# Patient Record
Sex: MALE | Race: WHITE | NOT HISPANIC OR LATINO | Employment: FULL TIME | URBAN - METROPOLITAN AREA
[De-identification: names, ages, dates, MRNs, and addresses within clinical notes are randomized per-mention and may not be internally consistent; named-entity substitution may affect disease eponyms.]

---

## 2019-01-10 ENCOUNTER — TRANSCRIBE ORDERS (OUTPATIENT)
Dept: ADMINISTRATIVE | Facility: HOSPITAL | Age: 49
End: 2019-01-10

## 2019-01-10 DIAGNOSIS — I48.91 ATRIAL FIBRILLATION, UNSPECIFIED TYPE (HCC): Primary | ICD-10-CM

## 2019-01-14 ENCOUNTER — TRANSCRIBE ORDERS (OUTPATIENT)
Dept: ADMINISTRATIVE | Facility: HOSPITAL | Age: 49
End: 2019-01-14

## 2019-01-14 DIAGNOSIS — I50.9 HEART FAILURE, UNSPECIFIED HF CHRONICITY, UNSPECIFIED HEART FAILURE TYPE (HCC): Primary | ICD-10-CM

## 2019-01-16 ENCOUNTER — HOSPITAL ENCOUNTER (OUTPATIENT)
Dept: NON INVASIVE DIAGNOSTICS | Facility: HOSPITAL | Age: 49
Discharge: HOME | End: 2019-01-16
Payer: COMMERCIAL

## 2019-01-16 DIAGNOSIS — I48.91 ATRIAL FIBRILLATION, UNSPECIFIED TYPE (HCC): ICD-10-CM

## 2019-01-16 PROCEDURE — 93306 TTE W/DOPPLER COMPLETE: CPT

## 2019-01-17 PROCEDURE — 93306 TTE W/DOPPLER COMPLETE: CPT | Performed by: INTERNAL MEDICINE

## 2019-01-21 ENCOUNTER — HOSPITAL ENCOUNTER (OUTPATIENT)
Dept: RADIOLOGY | Facility: HOSPITAL | Age: 49
Discharge: HOME | End: 2019-01-21
Payer: COMMERCIAL

## 2019-01-21 ENCOUNTER — HOSPITAL ENCOUNTER (OUTPATIENT)
Dept: NON INVASIVE DIAGNOSTICS | Facility: HOSPITAL | Age: 49
Discharge: HOME | End: 2019-01-21
Payer: COMMERCIAL

## 2019-01-21 DIAGNOSIS — I50.9 HEART FAILURE, UNSPECIFIED HF CHRONICITY, UNSPECIFIED HEART FAILURE TYPE (HCC): ICD-10-CM

## 2019-01-21 LAB
CHEST PAIN STATEMENT: NORMAL
MAX DIASTOLIC BP: 86 MMHG
MAX DIASTOLIC BP: 86 MMHG
MAX HEART RATE: 126 BPM
MAX HEART RATE: 150 BPM
MAX PREDICTED HEART RATE: 170 BPM
MAX PREDICTED HEART RATE: 170 BPM
MAX. SYSTOLIC BP: 156 MMHG
MAX. SYSTOLIC BP: 174 MMHG
PROTOCOL NAME: NORMAL
PROTOCOL NAME: NORMAL
REASON FOR TERMINATION: NORMAL
TARGET HR FORMULA: NORMAL
TARGET HR FORMULA: NORMAL
TEST INDICATION: NORMAL
TEST INDICATION: NORMAL
TIME IN EXERCISE PHASE: NORMAL
TIME IN EXERCISE PHASE: NORMAL

## 2019-01-21 PROCEDURE — A9502 TC99M TETROFOSMIN: HCPCS

## 2019-01-21 PROCEDURE — 93017 CV STRESS TEST TRACING ONLY: CPT

## 2019-01-21 PROCEDURE — 78452 HT MUSCLE IMAGE SPECT MULT: CPT

## 2019-01-21 RX ORDER — ATORVASTATIN CALCIUM 20 MG/1
20 TABLET, FILM COATED ORAL DAILY
COMMUNITY
End: 2019-09-25 | Stop reason: SDUPTHER

## 2019-01-21 RX ORDER — FUROSEMIDE 20 MG/1
40 TABLET ORAL DAILY
COMMUNITY
End: 2019-02-12 | Stop reason: ALTCHOICE

## 2019-01-21 RX ADMIN — REGADENOSON 0.4 MG: 0.08 INJECTION, SOLUTION INTRAVENOUS at 09:05

## 2019-01-22 PROCEDURE — 93018 CV STRESS TEST I&R ONLY: CPT | Performed by: INTERNAL MEDICINE

## 2019-01-22 PROCEDURE — 78452 HT MUSCLE IMAGE SPECT MULT: CPT | Performed by: INTERNAL MEDICINE

## 2019-01-22 PROCEDURE — 93016 CV STRESS TEST SUPVJ ONLY: CPT | Performed by: INTERNAL MEDICINE

## 2019-02-07 ENCOUNTER — OFFICE VISIT (OUTPATIENT)
Dept: CARDIOLOGY CLINIC | Facility: CLINIC | Age: 49
End: 2019-02-07
Payer: COMMERCIAL

## 2019-02-07 VITALS
WEIGHT: 228.3 LBS | HEIGHT: 73 IN | DIASTOLIC BLOOD PRESSURE: 90 MMHG | HEART RATE: 72 BPM | SYSTOLIC BLOOD PRESSURE: 110 MMHG | OXYGEN SATURATION: 96 % | BODY MASS INDEX: 30.26 KG/M2

## 2019-02-07 DIAGNOSIS — I50.33 ACUTE ON CHRONIC DIASTOLIC CHF (CONGESTIVE HEART FAILURE) (HCC): ICD-10-CM

## 2019-02-07 DIAGNOSIS — E78.2 MIXED HYPERLIPIDEMIA: ICD-10-CM

## 2019-02-07 DIAGNOSIS — I25.10 CAD IN NATIVE ARTERY: ICD-10-CM

## 2019-02-07 DIAGNOSIS — R06.00 EXERTIONAL DYSPNEA: Primary | ICD-10-CM

## 2019-02-07 DIAGNOSIS — R60.0 BILATERAL LEG EDEMA: ICD-10-CM

## 2019-02-07 DIAGNOSIS — R94.31 ABNORMAL EKG: ICD-10-CM

## 2019-02-07 DIAGNOSIS — R94.39 ABNORMAL STRESS TEST: ICD-10-CM

## 2019-02-07 PROCEDURE — 93000 ELECTROCARDIOGRAM COMPLETE: CPT | Performed by: INTERNAL MEDICINE

## 2019-02-07 PROCEDURE — 99245 OFF/OP CONSLTJ NEW/EST HI 55: CPT | Performed by: INTERNAL MEDICINE

## 2019-02-07 RX ORDER — METOPROLOL SUCCINATE 25 MG/1
25 TABLET, EXTENDED RELEASE ORAL
Qty: 30 TABLET | Refills: 3 | Status: SHIPPED | OUTPATIENT
Start: 2019-02-07 | End: 2019-03-08 | Stop reason: SDUPTHER

## 2019-02-07 RX ORDER — TEMAZEPAM 15 MG/1
15 CAPSULE ORAL ONCE
COMMUNITY
End: 2019-02-14 | Stop reason: ALTCHOICE

## 2019-02-07 RX ORDER — UREA 10 %
3 LOTION (ML) TOPICAL
COMMUNITY

## 2019-02-07 NOTE — PROGRESS NOTES
Cardiology New Consult  Fabian Vila  1970  [id number]  [address] [facility] [address]  [address] [address] [phone]      Reason for consultation:  Abnormal stress test    1  Exertional dyspnea     2  Abnormal stress test     3  Abnormal EKG     4  Mixed hyperlipidemia     5  CAD in native artery        Discussion/Plan:  Exertional dyspnea/abnormal stress test- plan to set-up cardiac cath to rule out obstructive CAD  Blood work prior to cath  He will hold Eliquis after Friday  If he develops chest pain he would come to the ED  He will continue with his atorvastatin plus metoprolol  Acute diastolic heart failure- possibly rate related  Continue furosemide 40 mg daily  Add metoprolol 25 mg daily  Will hold adding ACE-inhibitor  Plan for cardiac catheterization to rule out ischemic heart disease  Atrial fibrillation- currently on eliquis  Alcohol usage  Add metoprolol 25mg daily    Asthma    Alcohol abuse- cessation    Hld- continue atorvastatin    History:  49-year-old gentleman with no prior cardiac history presents with increased exertional shortness of breath found to be in atrial fibrillation  He also has had worsening lower extremity edema, PND and orthopnea  A nuclear stress test was completed which shows a inferior defect with moderate dc-infarct ischemia  He had an echo 2D which shows normal LV systolic function  He has been on a diuretic with improvement in his lower extremity edema but shortness of breath still remains  He is currently in rate controlled atrial fibrillation  He denies having any prior history of myocardial infarction or stroke  He is compliant with Eliquis without any significant bleeding  He currently denies having active chest pain  There is no problem list on file for this patient      Past Medical History:   Diagnosis Date    Asthma     Hyperlipidemia     Hypertension      Social History     Social History    Marital status: Single     Spouse name: N/A    Number of children: N/A    Years of education: N/A     Occupational History    Not on file  Social History Main Topics    Smoking status: Never Smoker    Smokeless tobacco: Current User     Types: Chew    Alcohol use 1 8 oz/week     3 Standard drinks or equivalent per week    Drug use: No    Sexual activity: Not on file     Other Topics Concern    Not on file     Social History Narrative    No narrative on file      Family History   Problem Relation Age of Onset    No Known Problems Mother     No Known Problems Father      History reviewed  No pertinent surgical history  Current Outpatient Prescriptions:     apixaban (ELIQUIS) 5 mg, Take 5 mg by mouth 2 (two) times a day, Disp: , Rfl:     atorvastatin (LIPITOR) 20 mg tablet, Take 20 mg by mouth daily, Disp: , Rfl:     furosemide (LASIX) 20 mg tablet, Take 40 mg by mouth daily  , Disp: , Rfl:     lansoprazole (PREVACID) 30 mg capsule, Take 30 mg by mouth daily  , Disp: , Rfl:     melatonin 1 mg, Take 1 mg by mouth daily at bedtime, Disp: , Rfl:     Mometasone Furo-Formoterol Fum (DULERA) 200-5 MCG/ACT AERO, Inhale, Disp: , Rfl:     POTASSIUM CHLORIDE ER PO, Take 10 mEq by mouth daily, Disp: , Rfl:     temazepam (RESTORIL) 15 mg capsule, Take 15 mg by mouth once, Disp: , Rfl:     albuterol (PROVENTIL HFA,VENTOLIN HFA) 90 mcg/act inhaler, Inhale 2 puffs every 6 (six) hours as needed for wheezing, Disp: , Rfl:     amLODIPine (NORVASC) 10 mg tablet, Take 10 mg by mouth daily  , Disp: , Rfl:     cloNIDine (CATAPRES) 0 1 mg tablet, Take 0 1 mg by mouth 2 (two) times a day , Disp: , Rfl:     ketorolac (TORADOL) 10 mg tablet, Take 1 tablet by mouth every 6 (six) hours as needed for moderate pain for up to 5 days, Disp: 20 tablet, Rfl: 0    ondansetron (ZOFRAN-ODT) 4 mg disintegrating tablet, Take 1 tablet by mouth every 8 (eight) hours as needed for nausea or vomiting for up to 7 days, Disp: 20 tablet, Rfl: 0    tamsulosin (FLOMAX) 0 4 mg, Take 0 4 mg by mouth daily with dinner , Disp: , Rfl:   No Known Allergies    Review of Systems:  Review of Systems   Constitutional: Negative  HENT: Negative  Eyes: Negative  Respiratory: Negative  Cardiovascular: Positive for chest pain and leg swelling  Gastrointestinal: Negative  Endocrine: Negative  Genitourinary: Negative  Musculoskeletal: Negative  Skin: Negative  Allergic/Immunologic: Negative  Neurological: Negative  Hematological: Negative  Psychiatric/Behavioral: Negative  Vitals:    02/07/19 1510   BP: 110/90   BP Location: Right arm   Patient Position: Sitting   Cuff Size: Large   Pulse: 72   SpO2: 96%   Weight: 104 kg (228 lb 4 8 oz)   Height: 6' 1" (1 854 m)     Physical Exam:  Physical Exam   Constitutional: He is oriented to person, place, and time  No distress  HENT:   Head: Normocephalic and atraumatic  Right Ear: External ear normal    Left Ear: External ear normal    Eyes: Pupils are equal, round, and reactive to light  Conjunctivae are normal  Right eye exhibits no discharge  Left eye exhibits no discharge  No scleral icterus  Neck: Normal range of motion  Neck supple  No JVD present  No tracheal deviation present  No thyromegaly present  Cardiovascular: An irregularly irregular rhythm present  Exam reveals gallop  Exam reveals no friction rub  No murmur heard  Pulmonary/Chest: Effort normal and breath sounds normal  No stridor  No respiratory distress  He has no wheezes  He has no rales  He exhibits no tenderness  Abdominal: Soft  Bowel sounds are normal  He exhibits distension  He exhibits no mass  There is no tenderness  There is no rebound and no guarding  Musculoskeletal: Normal range of motion  He exhibits edema  He exhibits no tenderness or deformity     Neurological: He is alert and oriented to person, place, and time  He has normal reflexes  No cranial nerve deficit  He exhibits normal muscle tone  Coordination normal    Skin: Skin is warm and dry  No rash noted  He is not diaphoretic  No erythema  No pallor  Psychiatric: He has a normal mood and affect  His behavior is normal  Judgment and thought content normal    Nursing note and vitals reviewed  Labs:     Lab Results   Component Value Date    WBC 6 20 2016    HGB 14 8 2016    HCT 43 0 2016    MCV 92 2016     2016     Lab Results   Component Value Date    K 3 7 2016    CL 99 (L) 2016    CO2 23 2016    BUN 17 2016    CREATININE 0 98 2016    CALCIUM 8 7 2016    AST 32 2016    ALT 56 2016    ALKPHOS 82 2016    EGFR >60 0 2016     No results found for: CHOL  No results found for: HDL  No results found for: LDLCALC  No results found for: TRIG  No results found for: HGBA1C    Imaging & Testing   I have personally reviewed pertinent reports  EKG: Personally reviewed      Atrial fibrillation poor r-wave progression no acute st/t wave changes    Cardiac testing:   Results for orders placed during the hospital encounter of 19   Echo complete with contrast if indicated    Narrative [address]  [address] [address]  [phone]    Transthoracic Echocardiogram  2D, M-mode, Doppler, and Color Doppler    Study date:  2019    Patient: Rock Mcgraw  MR number: [id number]  Account number: [de-identified]  : 1970  Age: 46 years  Gender: Male  Status: Outpatient  Location: Echo lab  Height: 72 in  Weight: 209 7 lb  BP: 129/ 82 mmHg    Indications: atrial fibrillation    Diagnoses: I48 0 - Atrial fibrillation    Sonographer:  CARROLL Arenas  Primary Physician:  Yoan Monterroso DO  Referring Physician:  Dennie Millard, MD  Group:  [facility]  Interpreting Physician:  Yosvany Wood MD    SUMMARY    LEFT VENTRICLE:  Definity given for improved LV definition  Systolic function was at the lower limits of normal  Ejection fraction was estimated in the range of 50 % to 55 % to be 55 %  There were no regional wall motion abnormalities  Wall thickness was mildly increased  LEFT ATRIUM:  The atrium was moderately dilated based on linear measurement of 4 4cm and index area of 35 ml/m2    RIGHT ATRIUM:  The atrium was mildly dilated based on right atrial area measurement of 22 cm2    MITRAL VALVE:  There was trace regurgitation  TRICUSPID VALVE:  The tricuspid jet envelope definition was inadequate for estimation of RV systolic pressure  There are no indirect findings (abnormal RV volume or geometry, altered pulmonary flow velocity profile, or leftward septal displacement) which  would suggest moderate or severe pulmonary hypertension  HISTORY: PRIOR HISTORY: HTN,Hyperlipidemia,Asthma  PROCEDURE: The procedure was performed in the echo lab  This was a routine study  The transthoracic approach was used  The study included complete 2D imaging, M-mode, complete spectral Doppler, and color Doppler  The heart rate was 105  bpm, at the start of the study  Images were obtained from the parasternal, apical, subcostal, and suprasternal notch acoustic windows  Intravenous contrast (Definity solution [1 3 ml Definity/8 7ml normal saline solution], 5 ml) was  administered to evaluate myocardial perfusion and opacify the left ventricle  Echocardiographic views were limited due to poor acoustic window availability, decreased penetration, and lung interference  This was a technically difficult  study  LEFT VENTRICLE: Definity given for improved LV definition Size was normal  Systolic function was at the lower limits of normal  Ejection fraction was estimated in the range of 50 % to 55 % to be 55 %  There were no regional wall motion  abnormalities  Wall thickness was mildly increased   No evidence of apical thrombus  DOPPLER: The study was not technically sufficient to allow evaluation of LV diastolic function  RIGHT VENTRICLE: The size was normal  Systolic function was normal  Wall thickness was normal     LEFT ATRIUM: The atrium was moderately dilated based on linear measurement of 4 4cm and index area of 35 ml/m2    RIGHT ATRIUM: The atrium was mildly dilated based on right atrial area measurement of 22 cm2    MITRAL VALVE: There was mild thickening  There was normal leaflet separation  DOPPLER: The transmitral velocity was within the normal range  There was no evidence for stenosis  There was trace regurgitation  AORTIC VALVE: The valve was probably trileaflet  Leaflets exhibited mildly increased thickness and normal cuspal separation  DOPPLER: Transaortic velocity was within the normal range  There was no evidence for stenosis  There was no  significant regurgitation  TRICUSPID VALVE: The valve structure was normal  There was normal leaflet separation  DOPPLER: The transtricuspid velocity was within the normal range  There was no evidence for stenosis  There was no significant regurgitation  The  tricuspid jet envelope definition was inadequate for estimation of RV systolic pressure  There are no indirect findings (abnormal RV volume or geometry, altered pulmonary flow velocity profile, or leftward septal displacement) which would  suggest moderate or severe pulmonary hypertension  PULMONIC VALVE: Leaflets exhibited normal thickness, no calcification, and normal cuspal separation  DOPPLER: The transpulmonic velocity was within the normal range  There was no significant regurgitation  PERICARDIUM: There was no pericardial effusion  The pericardium was normal in appearance  AORTA: The root exhibited normal size  SYSTEMIC VEINS: IVC: The inferior vena cava was normal in size      SYSTEM MEASUREMENT TABLES    2D mode  AoR Diam 2D: 3 6 cm  LA Diam (2D): 4 4 cm  LA/Ao (2D): 1 22  FS (2D Teich): 25 9 %  IVSd (2D): 1 2 cm  LVDEV: 113 cm³  LVESV: 55 5 cm³  LVIDd(2D): 4 9 cm  LVISd (2D): 3 63 cm  LVOT Area 2D: 3 14 cm squared  LVPWd (2D): 1 24 cm  SV (Teich): 57 5 cm³    Unspecified Scan Mode  DHARA Cont Eq (Peak Jason): 2 36 cm squared  LVOT Diam : 2 cm  LVOT Vmax: 775 mm/s  LVOT Vmax; Mean: 775 mm/s  Peak Grad ; Mean: 2 mm[Hg]  MV Peak E Jason  Mean: 780 mm/s  MVA (PHT): 4 cm squared  PHT: 55 ms  RA Area: 22 3 cm squared  RA Volume: 62 7 cm³  TAPSE: 1 6 cm    [employer] Accredited Echocardiography Laboratory    Prepared and electronically signed by    Tera Gonzales MD  Signed 2019 11:16:34       No results found for this or any previous visit  No results found for this or any previous visit  Results for orders placed during the hospital encounter of 19   NM myocardial perfusion spect (stress and/or rest)    [facility] [address]  [address] [address]  [phone]    Rest/Stress Gated SPECT Myocardial Perfusion Imaging After Regadenoson    Patient: Alli Vivar  MR number: [id number]  Account number: [de-identified]  : 1970  Age: 46 years  Gender: Male  Status: Outpatient  Location: Stress lab  Height: 72 in  Weight: 210 lb  BP: 156/ 86 mmHg    Allergies: NO KNOWN ALLERGIES    Diagnosis: I50 9 - Heart failure, unspecified    Primary Physician:  Leonora Padron DO  Technician:  Manish Armenta  RN:  Claudette Catching, BSN  Referring Physician:  Brinda Lin MD  [facility]  Report Prepared By[de-identified]  Claudette Catching, BSN  Interpreting Physician:  Julio Chiu MD    INDICATIONS: Evaluation for coronary artery disease  HISTORY: The patient is a 46year old  male  Chest pain status: no chest pain  Other symptoms: dyspnea  Coronary artery disease risk factors: dyslipidemia and hypertension  Cardiovascular history: arrhythmia  Co-morbidity: history  of lung disease   Medications:an anticoagulant, a calcium channel blocker, a diuretic, clopidogrel, and an antihypertensive agent  PHYSICAL EXAM: Baseline physical exam screening: normal and no wheezes audible  REST ECG: Atrial fibrillation  PROCEDURE: The study was performed in the the Stress lab  A regadenoson infusion pharmacologic stress test was performed  Gated SPECT myocardial perfusion imaging was performed after stress and at rest  Systolic blood pressure was 156  mmHg, at the start of the study  Diastolic blood pressure was 86 mmHg, at the start of the study  The heart rate was 117 bpm, at the start of the study  IV double checked  Regadenoson protocol:  Time HR bpm SBP mmHg DBP mmHg Symptoms Rhythm/conduct  Baseline 08:55 117 156 86 none A-fib, rare PVC's  Immediate 09:05 134 174 86 mild dyspnea same as above  1 min 09:50 126 148 84 same as above --  2 min 09:51 123 150 84 same as above --  3 min 09:50 110 152 84 subsiding --  No medications or fluids given  The patient also performed low level exercise  STRESS SUMMARY: Duration of pharmacologic stress was 3 min  Maximal heart rate during stress was 150 bpm  The heart rate response to stress was normal  There was resting hypertension with an appropriate blood pressure response to stress  The rate-pressure product for the peak heart rate and blood pressure was 68728  There was no chest pain during stress  The stress test was terminated due to protocol completion  Pre oxygen saturation: 96 %  Peak oxygen saturation: 98 %  The stress ECG was equivocal for ischemia  Arrhythmia during stress: isolated premature ventricular beats  ISOTOPE ADMINISTRATION:  Resting isotope administration Stress isotope administration  Agent Tetrofosmin Tetrofosmin  Date 01/21/2019 01/21/2019    The radiopharmaceutical was injected at the peak effect of pharmacologic stress  MYOCARDIAL PERFUSION IMAGING:  Left ventricular size was normal  The TID ratio was   86      PERFUSION DEFECTS:  -  There was a moderate to large, severe, partially reversible myocardial perfusion defect of the entire inferolateral and inferoapical wall  GATED SPECT:  The calculated left ventricular ejection fraction was 43 %  Left ventricular ejection fraction was mildly decreased by visual estimate  There was moderately reduced myocardial thickening and motion of the lateral wall and inferior wall of  the left ventricle  SUMMARY:  -  Stress results: There was resting hypertension with an appropriate blood pressure response to stress  There was no chest pain during stress  -  ECG conclusions: The stress ECG was equivocal for ischemia  -  Perfusion imaging: There was a moderate to large, severe, partially reversible myocardial perfusion defect of the entire inferolateral and inferoapical wall  -  Gated SPECT: The calculated left ventricular ejection fraction was 43 %  Left ventricular ejection fraction was mildly decreased by visual estimate  There was moderately reduced myocardial thickening and motion of the lateral wall and  inferior wall of the left ventricle  -  Impressions and recommendations: Left ventricular systolic function was reduced, with regional wall motion abnormalities  Ef 43%  Pt was in atrial Fib during study  IMPRESSIONS: Abnormal study after pharmacologic vasodilation  There was a moderate-sized infarct and a moderate amount of ischemia in the distribution of right coronary artery or the left circumflex coronary artery  Left ventricular  systolic function was reduced, with regional wall motion abnormalities  Ef 43%  Pt was in atrial Fib during study      Prepared and signed by    Shiva Ruvalcaba MD  Signed 2019 12:01:54           Storm Jackman  Please call with any questions or suggestions    A description of the counselin min abnormal stress test, consent, catheterization  Goals and Barriers:  Patient's ability to self care:  Medication side effect reviewed with patient in detail and all their questions answered  "This note has been constructed using a voice recognition system  Therefore there may be syntax, spelling, and/or grammatical errors   Please call if you have any questions  "

## 2019-02-12 ENCOUNTER — HOSPITAL ENCOUNTER (OUTPATIENT)
Dept: NON INVASIVE DIAGNOSTICS | Facility: HOSPITAL | Age: 49
Discharge: HOME | End: 2019-02-12
Attending: INTERNAL MEDICINE | Admitting: INTERNAL MEDICINE
Payer: COMMERCIAL

## 2019-02-12 VITALS
SYSTOLIC BLOOD PRESSURE: 117 MMHG | BODY MASS INDEX: 30.22 KG/M2 | TEMPERATURE: 96.7 F | RESPIRATION RATE: 20 BRPM | OXYGEN SATURATION: 98 % | WEIGHT: 228 LBS | HEART RATE: 80 BPM | HEIGHT: 73 IN | DIASTOLIC BLOOD PRESSURE: 68 MMHG

## 2019-02-12 DIAGNOSIS — R94.39 ABNORMAL STRESS TEST: ICD-10-CM

## 2019-02-12 DIAGNOSIS — R60.0 BILATERAL LEG EDEMA: ICD-10-CM

## 2019-02-12 DIAGNOSIS — I25.10 CAD IN NATIVE ARTERY: ICD-10-CM

## 2019-02-12 PROCEDURE — 99152 MOD SED SAME PHYS/QHP 5/>YRS: CPT | Performed by: INTERNAL MEDICINE

## 2019-02-12 PROCEDURE — 99153 MOD SED SAME PHYS/QHP EA: CPT | Performed by: INTERNAL MEDICINE

## 2019-02-12 PROCEDURE — C1769 GUIDE WIRE: HCPCS | Performed by: INTERNAL MEDICINE

## 2019-02-12 PROCEDURE — 93458 L HRT ARTERY/VENTRICLE ANGIO: CPT | Performed by: INTERNAL MEDICINE

## 2019-02-12 PROCEDURE — 33210 INSERT ELECTRD/PM CATH SNGL: CPT | Performed by: INTERNAL MEDICINE

## 2019-02-12 PROCEDURE — C1894 INTRO/SHEATH, NON-LASER: HCPCS | Performed by: INTERNAL MEDICINE

## 2019-02-12 RX ORDER — HEPARIN SODIUM 1000 [USP'U]/ML
INJECTION, SOLUTION INTRAVENOUS; SUBCUTANEOUS CODE/TRAUMA/SEDATION MEDICATION
Status: COMPLETED | OUTPATIENT
Start: 2019-02-12 | End: 2019-02-12

## 2019-02-12 RX ORDER — SODIUM CHLORIDE 9 MG/ML
100 INJECTION, SOLUTION INTRAVENOUS CONTINUOUS
Status: DISPENSED | OUTPATIENT
Start: 2019-02-12 | End: 2019-02-12

## 2019-02-12 RX ORDER — MIDAZOLAM HYDROCHLORIDE 1 MG/ML
INJECTION INTRAMUSCULAR; INTRAVENOUS CODE/TRAUMA/SEDATION MEDICATION
Status: COMPLETED | OUTPATIENT
Start: 2019-02-12 | End: 2019-02-12

## 2019-02-12 RX ORDER — ASPIRIN 81 MG/1
TABLET, CHEWABLE ORAL CODE/TRAUMA/SEDATION MEDICATION
Status: COMPLETED | OUTPATIENT
Start: 2019-02-12 | End: 2019-02-12

## 2019-02-12 RX ORDER — LIDOCAINE HYDROCHLORIDE 10 MG/ML
INJECTION, SOLUTION INFILTRATION; PERINEURAL CODE/TRAUMA/SEDATION MEDICATION
Status: COMPLETED | OUTPATIENT
Start: 2019-02-12 | End: 2019-02-12

## 2019-02-12 RX ORDER — NITROGLYCERIN 20 MG/100ML
INJECTION INTRAVENOUS CODE/TRAUMA/SEDATION MEDICATION
Status: COMPLETED | OUTPATIENT
Start: 2019-02-12 | End: 2019-02-12

## 2019-02-12 RX ORDER — VERAPAMIL HYDROCHLORIDE 2.5 MG/ML
INJECTION, SOLUTION INTRAVENOUS CODE/TRAUMA/SEDATION MEDICATION
Status: COMPLETED | OUTPATIENT
Start: 2019-02-12 | End: 2019-02-12

## 2019-02-12 RX ORDER — FUROSEMIDE 40 MG/1
40 TABLET ORAL 2 TIMES DAILY
COMMUNITY
End: 2019-02-27 | Stop reason: SDUPTHER

## 2019-02-12 RX ORDER — FENTANYL CITRATE 50 UG/ML
INJECTION, SOLUTION INTRAMUSCULAR; INTRAVENOUS CODE/TRAUMA/SEDATION MEDICATION
Status: COMPLETED | OUTPATIENT
Start: 2019-02-12 | End: 2019-02-12

## 2019-02-12 RX ADMIN — LIDOCAINE HYDROCHLORIDE ANHYDROUS 2 ML: 10 INJECTION, SOLUTION INFILTRATION at 09:35

## 2019-02-12 RX ADMIN — IOHEXOL 70 ML: 350 INJECTION, SOLUTION INTRAVENOUS at 09:47

## 2019-02-12 RX ADMIN — FENTANYL CITRATE 25 MCG: 50 INJECTION INTRAMUSCULAR; INTRAVENOUS at 09:18

## 2019-02-12 RX ADMIN — VERAPAMIL HYDROCHLORIDE 2.5 MG: 2.5 INJECTION, SOLUTION INTRAVENOUS at 09:24

## 2019-02-12 RX ADMIN — ASPIRIN 324 MG: 81 TABLET, CHEWABLE ORAL at 09:05

## 2019-02-12 RX ADMIN — LIDOCAINE HYDROCHLORIDE ANHYDROUS 2 ML: 10 INJECTION, SOLUTION INFILTRATION at 09:22

## 2019-02-12 RX ADMIN — HEPARIN SODIUM 4000 UNITS: 1000 INJECTION INTRAVENOUS; SUBCUTANEOUS at 09:24

## 2019-02-12 RX ADMIN — MIDAZOLAM HYDROCHLORIDE 1 MG: 1 INJECTION, SOLUTION INTRAMUSCULAR; INTRAVENOUS at 09:18

## 2019-02-12 RX ADMIN — MIDAZOLAM HYDROCHLORIDE 1 MG: 1 INJECTION, SOLUTION INTRAMUSCULAR; INTRAVENOUS at 09:12

## 2019-02-12 RX ADMIN — FENTANYL CITRATE 25 MCG: 50 INJECTION INTRAMUSCULAR; INTRAVENOUS at 09:12

## 2019-02-12 RX ADMIN — NITROGLYCERIN 200 MCG: 20 INJECTION INTRAVENOUS at 09:24

## 2019-02-12 NOTE — H&P
Patient seen and evaluated  Spoke to patient's family  His stress test and echo reviewed  H and P reviewed  No changes patient is holding his Eliquis  He has shortness of breath with some orthopnea for some time  Feeling little bit better  He is also in atrial fibrillation  All risks benefit alternate complication discussed with the patient labs reviewed  Risk and complication of heart attack stroke death but not limited to it discussed with the patient wishes to proceed consent obtained

## 2019-02-14 ENCOUNTER — CONSULT (OUTPATIENT)
Dept: PULMONOLOGY | Facility: MEDICAL CENTER | Age: 49
End: 2019-02-14
Payer: COMMERCIAL

## 2019-02-14 VITALS
OXYGEN SATURATION: 98 % | DIASTOLIC BLOOD PRESSURE: 84 MMHG | TEMPERATURE: 98.5 F | RESPIRATION RATE: 12 BRPM | BODY MASS INDEX: 29.69 KG/M2 | HEIGHT: 73 IN | SYSTOLIC BLOOD PRESSURE: 122 MMHG | HEART RATE: 81 BPM | WEIGHT: 224 LBS

## 2019-02-14 DIAGNOSIS — R06.02 SOBOE (SHORTNESS OF BREATH ON EXERTION): Primary | ICD-10-CM

## 2019-02-14 DIAGNOSIS — I48.91 ATRIAL FIBRILLATION, UNSPECIFIED TYPE (HCC): ICD-10-CM

## 2019-02-14 PROCEDURE — 99244 OFF/OP CNSLTJ NEW/EST MOD 40: CPT | Performed by: INTERNAL MEDICINE

## 2019-02-14 PROCEDURE — 94010 BREATHING CAPACITY TEST: CPT | Performed by: INTERNAL MEDICINE

## 2019-02-14 NOTE — PROGRESS NOTES
Assessment/Plan:       Problem List Items Addressed This Visit        Cardiovascular and Mediastinum    Atrial fibrillation [facility])     This was recently diagnosed and cardiac catheterization done shows no of significant coronary artery disease  Alexsander Odonnell is on Eliquis now and will be following up with cardiologist Dr Aavni Connor  It is possible his persistent atrial fibrillation may be causing some limitation of his cardiac output with exercise leading to his symptoms with exertional shortness of breath            Other    SOBOE (shortness of breath on exertion) - Primary     Alexsander Odonnell has been having exertional dyspnea for well over 1 year but it has been more problems the last couple months  Spirometry today shows some borderline mild restrictive impairment  Cannot exclude component of air trapping  Does have the question of possible underlying asthma  Will order a complete pulmonary function test to assess for air trapping take it because of slight decrease in his FEV1  Forced vital capacity is borderline decreased to 4 5 L or 81% of predicted  FEV1 is slightly reduced at 3 26 L 76% of predicted with obstructive index of 72%       As Alexsander Odonnell does not think that the [facility] MDI has ever helped him , I  Instructed him to discontinue it and he can uses rescue ProAir inhaler as needed  He will call me know if he feels like his breathing gets worse when he stops to [facility]  I want him to go for pulmonary function test and anywhere from 2-4 weeks after he stopped the [facility] to see if there is any evidence of air trapping or any airflow obstruction  It is possible that his exertional dyspnea could be related to his atrial fibrillation and some slight decreased cardiac output with exertion because of his atrial fibrillation  Relevant Orders    POCT spirometry (Completed)    Complete pulmonary function test            Return if symptoms worsen or fail to improve    All questions are answered to the patient's satisfaction and understanding  He verbalizes understanding  He is encouraged to call with any further questions or concerns  Portions of the record may have been created with voice recognition software  Occasional wrong word or "sound a like" substitutions may have occurred due to the inherent limitations of voice recognition software  Read the chart carefully and recognize, using context, where substitutions have occurred  a    Electronically Signed by Michael Cabrera DO    ______________________________________________________________________    Chief Complaint:   Chief Complaint   Patient presents with    Shortness of Breath     dx in hosp  pt had cath done  pt states that he has notice any differences in his breathing during exertion  no cough    Wheezing        Patient ID: Mandy Pierce is a 46 y o  y o  male has a past medical history of Asthma, Atrial fibrillation ([address] ), Hyperlipidemia, and Hypertension  2/14/2019  Patient presents today for initial visit  HPI    Mandy Pierce is a 39-year-old male who presents for evaluation of exertional shortness of breath  He states he has had some mild shortness of breath with very exertional active this started about 2 years ago but has been more prominent over the past few months  No associated chest pain or wheezing  He was started on Dulera 200 mcg 2 puffs daily about a year so ago for his shortness of breath with thoughts that he may had some mild asthma  He never really noted any improvement with the [facility]  In the past few weeks he has had increased shortness of breath with exertional activity  When he climbs up a ladder has to walk up a hill he has some shortness of breath  His Dulera was increased to 2 puffs twice a day and still he notes no improvement in his exertional shortness of breath  He did not have any chronic cough  Last chest x-ray which are reviewed done in 2016 was normal   He is a nonsmoker    He denies any history of asthma when he was younger  He does not have any significant season allergies  He does work for a [employer]	[place] and frequently has a climb up trees and ladders  He only has an occasional wheeze  He does have a history of GERD and HTN  Jorge Zaragoza did see his family physician Dr Chante Hook for complaints of his exertional shortness of breath  An EKG was done in his office and this showed evidence of atrial fibrillation  He was then referred to cardiologist Dr Felicia Barone  Echocardiogram was normal and cardiac catheterization which was done showed no significant coronary artery disease  He was started on Eliquis 5 mg twice a day and also furosemide 40 mg twice a day  Is also now taking Lipitor 20 mg daily and Toprol XL 25 mg daily  Cardiac catheterization was done at [facility] on 02/12/19  This showed only mild coronary artery disease  There was a 30% diffuse stenosis the mid LAD and 25% stenosis of the mid circumflex  Left ventricular ejection fraction was normal   There was 25% stenosis the middle 3rd of the right coronary artery  Echocardiogram done January 16, 2019 showed normal left ventricle wall motion with ejection fraction of 55%  Right ventricle size was normal   No evidence of any significant valvular disease  Labwork from 1/2/19 showed hemoglobin of 15 5 with slight elevation platelet count at 115  Triglycerides were elevated 247 cluster was elevated 274  Serum creatinine was normal at 0 96  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[place] [employer]    Review of Systems   Constitutional: Negative for activity change, appetite change, diaphoresis, fatigue and fever  HENT: Negative for congestion, postnasal drip and rhinorrhea  Eyes: Negative for redness  Respiratory: Positive for shortness of breath  Negative for cough and wheezing  Cardiovascular: Negative for chest pain, palpitations and leg swelling  Endocrine: Negative for polydipsia and polyphagia  Genitourinary: Negative for dysuria  Neurological: Negative for dizziness and light-headedness  Social history: He reports that he has never smoked  His smokeless tobacco use includes chew  He reports that he drinks about 1 8 oz of alcohol per week  He reports that he does not use drugs  Past surgical history: History reviewed  No pertinent surgical history  No prior surgeries    Family history:   Family History   Problem Relation Age of Onset    No Known Problems Mother     Prostate cancer Father          There is no immunization history on file for this patient  Current Outpatient Medications   Medication Sig Dispense Refill    albuterol (PROVENTIL HFA,VENTOLIN HFA) 90 mcg/act inhaler Inhale 2 puffs every 6 (six) hours as needed for wheezing      apixaban (ELIQUIS) 5 mg Take 1 tablet (5 mg total) by mouth 2 (two) times a day 30 tablet 0    atorvastatin (LIPITOR) 20 mg tablet Take 20 mg by mouth daily      furosemide (LASIX) 40 mg tablet Take 40 mg by mouth 2 (two) times a day      lansoprazole (PREVACID) 30 mg capsule Take 30 mg by mouth daily   melatonin 1 mg Take 1 mg by mouth daily at bedtime      metoprolol succinate (TOPROL-XL) 25 mg 24 hr tablet Take 1 tablet (25 mg total) by mouth daily in the early morning 30 tablet 3    Mometasone Furo-Formoterol Fum (DULERA) 200-5 MCG/ACT AERO Inhale      POTASSIUM CHLORIDE ER PO Take 10 mEq by mouth daily       No current facility-administered medications for this visit  Allergies: Patient has no known allergies  Objective:  Vitals:    02/14/19 0924   BP: 122/84   BP Location: Left arm   Patient Position: Sitting   Cuff Size: Standard   Pulse: 81   Resp: 12   Temp: 98 5 °F (36 9 °C)   TempSrc: Tympanic   SpO2: 98%   Weight: 102 kg (224 lb)   Height: 6' 1" (1 854 m)   Oxygen Therapy  SpO2: 98 %      Wt Readings from Last 3 Encounters:   02/14/19 102 kg (224 lb)   02/12/19 103 kg (228 lb)   02/07/19 104 kg (228 lb 4 8 oz)     Body mass index is 29 55 kg/m²  Physical Exam   Constitutional: He is oriented to person, place, and time  He appears well-developed and well-nourished  No distress  HENT:   Head: Normocephalic  Nose: Nose normal    Mouth/Throat: Oropharynx is clear and moist  No oropharyngeal exudate  Mallampati score is 1   Eyes: Pupils are equal, round, and reactive to light  Conjunctivae are normal    Neck: Neck supple  No JVD present  No tracheal deviation present  Cardiovascular: Normal rate, regular rhythm and normal heart sounds  Exam reveals no decreased pulses (PFT)  Pulmonary/Chest: Effort normal and breath sounds normal    Lung sounds are clear   Abdominal: Soft  He exhibits no distension  There is no tenderness  There is no guarding  Musculoskeletal: He exhibits no edema  Lymphadenopathy:     He has no cervical adenopathy  Neurological: He is alert and oriented to person, place, and time  Skin: Skin is warm and dry  No rash noted  Psychiatric: He has a normal mood and affect   His behavior is normal  Thought content normal      Room air O2 sat at rest was 97% and with ambulating up and down 2 flights of stairs was 100%    Office Spirometry Results:done today  FVC - 4 50 L   81%  FEV1 - 3 26 L  76%  FEV1/FVC% - 72%    Borderline mild restrictive impairment

## 2019-02-14 NOTE — ASSESSMENT & PLAN NOTE
Carmelo Cole has been having exertional dyspnea for well over 1 year but it has been more problems the last couple months  Spirometry today shows some borderline mild restrictive impairment  Cannot exclude component of air trapping  Does have the question of possible underlying asthma  Will order a complete pulmonary function test to assess for air trapping take it because of slight decrease in his FEV1  Forced vital capacity is borderline decreased to 4 5 L or 81% of predicted  FEV1 is slightly reduced at 3 26 L 76% of predicted with obstructive index of 72%       As Carmelo Cole does not think that the [facility] MDI has ever helped him , I  Instructed him to discontinue it and he can uses rescue ProAir inhaler as needed  He will call me know if he feels like his breathing gets worse when he stops to [facility]  I want him to go for pulmonary function test and anywhere from 2-4 weeks after he stopped the [facility] to see if there is any evidence of air trapping or any airflow obstruction  It is possible that his exertional dyspnea could be related to his atrial fibrillation and some slight decreased cardiac output with exertion because of his atrial fibrillation

## 2019-02-14 NOTE — PATIENT INSTRUCTIONS
Stop the [facility] and go for pulmonary function test sometime in early March    Call my office after you have this done for results    Have cardioversion done as per Dr Nick Guerrero    If you have difficulty breathing when you are off the [facility] contact my office

## 2019-02-17 PROBLEM — I48.91 ATRIAL FIBRILLATION (HCC): Status: ACTIVE | Noted: 2019-02-17

## 2019-02-18 NOTE — ASSESSMENT & PLAN NOTE
This was recently diagnosed and cardiac catheterization done shows no of significant coronary artery disease  Cale Leslie is on Eliquis now and will be following up with cardiologist Dr Ernst Mukherjee    It is possible his persistent atrial fibrillation may be causing some limitation of his cardiac output with exercise leading to his symptoms with exertional shortness of breath

## 2019-02-27 ENCOUNTER — OFFICE VISIT (OUTPATIENT)
Dept: CARDIOLOGY CLINIC | Facility: CLINIC | Age: 49
End: 2019-02-27
Payer: COMMERCIAL

## 2019-02-27 VITALS
OXYGEN SATURATION: 97 % | WEIGHT: 224.5 LBS | HEIGHT: 73 IN | BODY MASS INDEX: 29.75 KG/M2 | SYSTOLIC BLOOD PRESSURE: 120 MMHG | HEART RATE: 95 BPM | DIASTOLIC BLOOD PRESSURE: 80 MMHG

## 2019-02-27 DIAGNOSIS — R94.31 ABNORMAL EKG: ICD-10-CM

## 2019-02-27 DIAGNOSIS — I50.33 ACUTE ON CHRONIC DIASTOLIC CHF (CONGESTIVE HEART FAILURE) (HCC): ICD-10-CM

## 2019-02-27 DIAGNOSIS — R94.39 ABNORMAL STRESS TEST: ICD-10-CM

## 2019-02-27 DIAGNOSIS — I48.19 PERSISTENT ATRIAL FIBRILLATION (HCC): ICD-10-CM

## 2019-02-27 DIAGNOSIS — R60.0 BILATERAL LEG EDEMA: ICD-10-CM

## 2019-02-27 PROCEDURE — 93000 ELECTROCARDIOGRAM COMPLETE: CPT | Performed by: INTERNAL MEDICINE

## 2019-02-27 PROCEDURE — 99214 OFFICE O/P EST MOD 30 MIN: CPT | Performed by: INTERNAL MEDICINE

## 2019-02-27 RX ORDER — FUROSEMIDE 40 MG/1
40 TABLET ORAL DAILY
Qty: 90 TABLET | Refills: 1
Start: 2019-02-27 | End: 2019-02-27 | Stop reason: SDUPTHER

## 2019-02-27 RX ORDER — FLECAINIDE ACETATE 50 MG/1
50 TABLET ORAL 2 TIMES DAILY
Qty: 60 TABLET | Refills: 3 | Status: SHIPPED | OUTPATIENT
Start: 2019-02-27 | End: 2019-03-08 | Stop reason: SDUPTHER

## 2019-02-27 RX ORDER — FUROSEMIDE 40 MG/1
20 TABLET ORAL DAILY
Qty: 90 TABLET | Refills: 1
Start: 2019-02-27 | End: 2019-03-08 | Stop reason: SDUPTHER

## 2019-02-27 NOTE — PROGRESS NOTES
Cardiology Follow-up  Naya Prom  1970  [id number]  [address] [facility] [address] [id number] [phone]      1  Persistent atrial fibrillation (HCC)  POCT ECG    flecainide (TAMBOCOR) 50 mg tablet    furosemide (LASIX) 40 mg tablet    CARDIOVERSION (NON INVASIVE ONLY)    Basic metabolic panel    DISCONTINUED: furosemide (LASIX) 40 mg tablet   2  Acute on chronic diastolic CHF (congestive heart failure) (HCC)  furosemide (LASIX) 40 mg tablet    CARDIOVERSION (NON INVASIVE ONLY)    Basic metabolic panel    DISCONTINUED: furosemide (LASIX) 40 mg tablet   3  Bilateral leg edema  furosemide (LASIX) 40 mg tablet    DISCONTINUED: furosemide (LASIX) 40 mg tablet   4  Abnormal EKG  furosemide (LASIX) 40 mg tablet    DISCONTINUED: furosemide (LASIX) 40 mg tablet   5  Abnormal stress test  apixaban (ELIQUIS) 5 mg      Discussion/Plan:  Exertional dyspnea/abnormal stress test- cardiac catheterization negative for obstructive CAD  Abnormal pulmonary function test likely with component of COPD  Component may be secondary to the patient's atrial fibrillation  He has never had trial of cardioversion  He has been on anticoagulation for more than 4 weeks without stopping  We will try cardioversion  Given he has a normal EF without obstructive CAD we will start flecainide 50 mg twice a day plus continue metoprolol  Acute diastolic heart failure- possibly rate related  Continue furosemide 40 mg daily  Add metoprolol 25 mg daily  Will hold adding ACE-inhibitor  Plan for cardiac catheterization to rule out ischemic heart disease  Atrial fibrillation- currently on eliquis  Alcohol usage   Add metoprolol 25mg daily    Asthma    Alcohol abuse- cessation    Hld- continue atorvastatin    History:  61-year-old gentleman with no prior cardiac history presents with increased exertional shortness of breath found to be in atrial fibrillation  He also has had worsening lower extremity edema, PND and orthopnea  A nuclear stress test was completed which shows a inferior defect with moderate dc-infarct ischemia  He had an echo 2D which shows normal LV systolic function  He has been on a diuretic with improvement in his lower extremity edema but shortness of breath still remains  He is currently in rate controlled atrial fibrillation  He denies having any prior history of myocardial infarction or stroke  He is compliant with Eliquis without any significant bleeding  He currently denies having active chest pain  Patient Active Problem List   Diagnosis    SOBOE (shortness of breath on exertion)    Atrial fibrillation (HCC)     Past Medical History:   Diagnosis Date    Asthma     Atrial fibrillation ([facility] [address] )     Hyperlipidemia     Hypertension      Social History     Socioeconomic History    Marital status: Significant Other     Spouse name: Not on file    Number of children: Not on file    Years of education: Not on file    Highest education level: Not on file   Occupational History    Not on file   Social Needs    Financial resource strain: Not on file    Food insecurity:     Worry: Not on file     Inability: Not on file    Transportation needs:     Medical: Not on file     Non-medical: Not on file   Tobacco Use    Smoking status: Never Smoker    Smokeless tobacco: Current User     Types: Chew    Tobacco comment: uses chewing tobacco   Substance and Sexual Activity    Alcohol use:  Yes     Alcohol/week: 1 8 oz     Types: 3 Standard drinks or equivalent per week     Frequency: 4 or more times a week     Drinks per session: 3 or 4    Drug use: No    Sexual activity: Not on file   Lifestyle    Physical activity:     Days per week: Not on file     Minutes per session: Not on file    Stress: Not on file   Relationships    Social connections:     Talks on phone: Not on file     Gets together: Not on file     Attends Church service: Not on file     Active member of club or organization: Not on file     Attends meetings of clubs or organizations: Not on file     Relationship status: Not on file    Intimate partner violence:     Fear of current or ex partner: Not on file     Emotionally abused: Not on file     Physically abused: Not on file     Forced sexual activity: Not on file   Other Topics Concern    Not on file   Social History Narrative    Not on file      Family History   Problem Relation Age of Onset    No Known Problems Mother     Prostate cancer Father      History reviewed  No pertinent surgical history  Current Outpatient Medications:     apixaban (ELIQUIS) 5 mg, Take 1 tablet (5 mg total) by mouth 2 (two) times a day, Disp: 180 tablet, Rfl: 3    atorvastatin (LIPITOR) 20 mg tablet, Take 20 mg by mouth daily, Disp: , Rfl:     furosemide (LASIX) 40 mg tablet, Take 0 5 tablets (20 mg total) by mouth daily, Disp: 90 tablet, Rfl: 1    lansoprazole (PREVACID) 30 mg capsule, Take 30 mg by mouth daily  , Disp: , Rfl:     melatonin 1 mg, Take 1 mg by mouth daily at bedtime, Disp: , Rfl:     metoprolol succinate (TOPROL-XL) 25 mg 24 hr tablet, Take 1 tablet (25 mg total) by mouth daily in the early morning, Disp: 30 tablet, Rfl: 3    POTASSIUM CHLORIDE ER PO, Take 10 mEq by mouth daily, Disp: , Rfl:     albuterol (PROVENTIL HFA,VENTOLIN HFA) 90 mcg/act inhaler, Inhale 2 puffs every 6 (six) hours as needed for wheezing, Disp: , Rfl:     flecainide (TAMBOCOR) 50 mg tablet, Take 1 tablet (50 mg total) by mouth 2 (two) times a day, Disp: 60 tablet, Rfl: 3    Mometasone Furo-Formoterol Fum (DULERA) 200-5 MCG/ACT AERO, Inhale, Disp: , Rfl:   No Known Allergies    Review of Systems:  Review of Systems   Constitutional: Negative  HENT: Negative  Eyes: Negative  Respiratory: Positive for shortness of breath  Cardiovascular: Positive for chest pain   Negative for leg swelling  Gastrointestinal: Negative  Endocrine: Negative  Genitourinary: Negative  Musculoskeletal: Negative  Skin: Negative  Allergic/Immunologic: Negative  Neurological: Negative  Hematological: Negative  Psychiatric/Behavioral: Negative  Vitals:    02/27/19 1256   BP: 120/80   BP Location: Right arm   Patient Position: Sitting   Cuff Size: Large   Pulse: 95   SpO2: 97%   Weight: 102 kg (224 lb 8 oz)   Height: 6' 1" (1 854 m)     Physical Exam:  Physical Exam   Constitutional: He is oriented to person, place, and time  No distress  HENT:   Head: Normocephalic and atraumatic  Right Ear: External ear normal    Left Ear: External ear normal    Eyes: Pupils are equal, round, and reactive to light  Conjunctivae are normal  Right eye exhibits no discharge  Left eye exhibits no discharge  No scleral icterus  Neck: Normal range of motion  Neck supple  No JVD present  No tracheal deviation present  No thyromegaly present  Cardiovascular: An irregularly irregular rhythm present  Exam reveals gallop  Exam reveals no friction rub  No murmur heard  Pulmonary/Chest: Effort normal and breath sounds normal  No stridor  No respiratory distress  He has no wheezes  He has no rales  He exhibits no tenderness  Abdominal: Soft  Bowel sounds are normal  He exhibits distension  He exhibits no mass  There is no tenderness  There is no rebound and no guarding  Musculoskeletal: Normal range of motion  He exhibits edema  He exhibits no tenderness or deformity  Neurological: He is alert and oriented to person, place, and time  He has normal reflexes  No cranial nerve deficit  He exhibits normal muscle tone  Coordination normal    Skin: Skin is warm and dry  No rash noted  He is not diaphoretic  No erythema  No pallor  Psychiatric: He has a normal mood and affect  His behavior is normal  Judgment and thought content normal    Nursing note and vitals reviewed        Labs:     Lab Results Component Value Date    WBC 6 20 2016    HGB 14 8 2016    HCT 43 0 2016    MCV 92 2016     2016     Lab Results   Component Value Date    K 3 7 2016    CL 99 (L) 2016    CO2 23 2016    BUN 17 2016    CREATININE 0 98 2016    CALCIUM 8 7 2016    AST 32 2016    ALT 56 2016    ALKPHOS 82 2016    EGFR >60 0 2016     No results found for: CHOL  No results found for: HDL  No results found for: LDLCALC  No results found for: TRIG  No results found for: HGBA1C    Imaging & Testing   I have personally reviewed pertinent reports  EKG: Personally reviewed  Atrial fibrillation poor r-wave progression no acute st/t wave changes    Cardiac testing:   Results for orders placed during the hospital encounter of 19   Echo complete with contrast if indicated    Narrative [address]  [address] [facility]  [phone]    Transthoracic Echocardiogram  2D, M-mode, Doppler, and Color Doppler    Study date:  2019    Patient: Eunice Deluca  MR number: [id number]  Account number: [de-identified]  : 1970  Age: 46 years  Gender: Male  Status: Outpatient  Location: Echo lab  Height: 72 in  Weight: 209 7 lb  BP: 129/ 82 mmHg    Indications: atrial fibrillation    Diagnoses: I48 0 - Atrial fibrillation    Sonographer:  CARROLL Blanco  Primary Physician:  Luis Miguel Knott DO  Referring Physician:  Mono Denis MD  Group:  [facility]  Interpreting Physician:  Surjit Reza MD    SUMMARY    LEFT VENTRICLE:  Definity given for improved LV definition  Systolic function was at the lower limits of normal  Ejection fraction was estimated in the range of 50 % to 55 % to be 55 %  There were no regional wall motion abnormalities  Wall thickness was mildly increased      LEFT ATRIUM:  The atrium was moderately dilated based on linear measurement of 4 4cm and index area of 35 ml/m2    RIGHT ATRIUM:  The atrium was mildly dilated based on right atrial area measurement of 22 cm2    MITRAL VALVE:  There was trace regurgitation  TRICUSPID VALVE:  The tricuspid jet envelope definition was inadequate for estimation of RV systolic pressure  There are no indirect findings (abnormal RV volume or geometry, altered pulmonary flow velocity profile, or leftward septal displacement) which  would suggest moderate or severe pulmonary hypertension  HISTORY: PRIOR HISTORY: HTN,Hyperlipidemia,Asthma  PROCEDURE: The procedure was performed in the echo lab  This was a routine study  The transthoracic approach was used  The study included complete 2D imaging, M-mode, complete spectral Doppler, and color Doppler  The heart rate was 105  bpm, at the start of the study  Images were obtained from the parasternal, apical, subcostal, and suprasternal notch acoustic windows  Intravenous contrast (Definity solution [1 3 ml Definity/8 7ml normal saline solution], 5 ml) was  administered to evaluate myocardial perfusion and opacify the left ventricle  Echocardiographic views were limited due to poor acoustic window availability, decreased penetration, and lung interference  This was a technically difficult  study  LEFT VENTRICLE: Definity given for improved LV definition Size was normal  Systolic function was at the lower limits of normal  Ejection fraction was estimated in the range of 50 % to 55 % to be 55 %  There were no regional wall motion  abnormalities  Wall thickness was mildly increased  No evidence of apical thrombus  DOPPLER: The study was not technically sufficient to allow evaluation of LV diastolic function      RIGHT VENTRICLE: The size was normal  Systolic function was normal  Wall thickness was normal     LEFT ATRIUM: The atrium was moderately dilated based on linear measurement of 4 4cm and index area of 35 ml/m2    RIGHT ATRIUM: The atrium was mildly dilated based on right atrial area measurement of 22 cm2    MITRAL VALVE: There was mild thickening  There was normal leaflet separation  DOPPLER: The transmitral velocity was within the normal range  There was no evidence for stenosis  There was trace regurgitation  AORTIC VALVE: The valve was probably trileaflet  Leaflets exhibited mildly increased thickness and normal cuspal separation  DOPPLER: Transaortic velocity was within the normal range  There was no evidence for stenosis  There was no  significant regurgitation  TRICUSPID VALVE: The valve structure was normal  There was normal leaflet separation  DOPPLER: The transtricuspid velocity was within the normal range  There was no evidence for stenosis  There was no significant regurgitation  The  tricuspid jet envelope definition was inadequate for estimation of RV systolic pressure  There are no indirect findings (abnormal RV volume or geometry, altered pulmonary flow velocity profile, or leftward septal displacement) which would  suggest moderate or severe pulmonary hypertension  PULMONIC VALVE: Leaflets exhibited normal thickness, no calcification, and normal cuspal separation  DOPPLER: The transpulmonic velocity was within the normal range  There was no significant regurgitation  PERICARDIUM: There was no pericardial effusion  The pericardium was normal in appearance  AORTA: The root exhibited normal size  SYSTEMIC VEINS: IVC: The inferior vena cava was normal in size  SYSTEM MEASUREMENT TABLES    2D mode  AoR Diam 2D: 3 6 cm  LA Diam (2D): 4 4 cm  LA/Ao (2D): 1 22  FS (2D Teich): 25 9 %  IVSd (2D): 1 2 cm  LVDEV: 113 cm³  LVESV: 55 5 cm³  LVIDd(2D): 4 9 cm  LVISd (2D): 3 63 cm  LVOT Area 2D: 3 14 cm squared  LVPWd (2D): 1 24 cm  SV (Teich): 57 5 cm³    Unspecified Scan Mode  DHARA Cont Eq (Peak Jason): 2 36 cm squared  LVOT Diam : 2 cm  LVOT Vmax: 775 mm/s  LVOT Vmax; Mean: 775 mm/s  Peak Grad ; Mean: 2 mm[Hg]  MV Peak E Jason   Mean: 780 mm/s  MVA (PHT): 4 cm squared  PHT: 55 ms  RA Area: 22 3 cm squared  RA Volume: 62 7 cm³  TAPSE: 1 6 cm    [facility] Accredited Echocardiography Laboratory    Prepared and electronically signed by    Lisseth Mariano MD  Signed 2019 11:16:34       No results found for this or any previous visit  No results found for this or any previous visit  Results for orders placed during the hospital encounter of 19   NM myocardial perfusion spect (stress and/or rest)    [facility] [address]  [address] [facility]  [phone]    Rest/Stress Gated SPECT Myocardial Perfusion Imaging After Regadenoson    Patient: Daniel Weller  MR number: [id number]  Account number: [de-identified]  : 1970  Age: 46 years  Gender: Male  Status: Outpatient  Location: Stress lab  Height: 72 in  Weight: 210 lb  BP: 156/ 86 mmHg    Allergies: NO KNOWN ALLERGIES    Diagnosis: I50 9 - Heart failure, unspecified    Primary Physician:  Wendy Yuan DO  Technician:  Alf Jacobson  RN:  ELIER Daly  Referring Physician:  Shane Damon MD  Group:  [facility]  Report Prepared By[de-identified]  ELIER Daly  Interpreting Physician:  Leatha Jiang MD    INDICATIONS: Evaluation for coronary artery disease  HISTORY: The patient is a 46year old  male  Chest pain status: no chest pain  Other symptoms: dyspnea  Coronary artery disease risk factors: dyslipidemia and hypertension  Cardiovascular history: arrhythmia  Co-morbidity: history  of lung disease  Medications:an anticoagulant, a calcium channel blocker, a diuretic, clopidogrel, and an antihypertensive agent  PHYSICAL EXAM: Baseline physical exam screening: normal and no wheezes audible  REST ECG: Atrial fibrillation  PROCEDURE: The study was performed in the the Stress lab  A regadenoson infusion pharmacologic stress test was performed   Gated SPECT myocardial perfusion imaging was performed after stress and at rest  Systolic blood pressure was 156  mmHg, at the start of the study  Diastolic blood pressure was 86 mmHg, at the start of the study  The heart rate was 117 bpm, at the start of the study  IV double checked  Regadenoson protocol:  Time HR bpm SBP mmHg DBP mmHg Symptoms Rhythm/conduct  Baseline 08:55 117 156 86 none A-fib, rare PVC's  Immediate 09:05 134 174 86 mild dyspnea same as above  1 min 09:50 126 148 84 same as above --  2 min 09:51 123 150 84 same as above --  3 min 09:50 110 152 84 subsiding --  No medications or fluids given  The patient also performed low level exercise  STRESS SUMMARY: Duration of pharmacologic stress was 3 min  Maximal heart rate during stress was 150 bpm  The heart rate response to stress was normal  There was resting hypertension with an appropriate blood pressure response to stress  The rate-pressure product for the peak heart rate and blood pressure was 67187  There was no chest pain during stress  The stress test was terminated due to protocol completion  Pre oxygen saturation: 96 %  Peak oxygen saturation: 98 %  The stress ECG was equivocal for ischemia  Arrhythmia during stress: isolated premature ventricular beats  ISOTOPE ADMINISTRATION:  Resting isotope administration Stress isotope administration  Agent Tetrofosmin Tetrofosmin  Date 01/21/2019 01/21/2019    The radiopharmaceutical was injected at the peak effect of pharmacologic stress  MYOCARDIAL PERFUSION IMAGING:  Left ventricular size was normal  The TID ratio was   86  PERFUSION DEFECTS:  -  There was a moderate to large, severe, partially reversible myocardial perfusion defect of the entire inferolateral and inferoapical wall  GATED SPECT:  The calculated left ventricular ejection fraction was 43 %  Left ventricular ejection fraction was mildly decreased by visual estimate   There was moderately reduced myocardial thickening and motion of the lateral wall and inferior wall of  the left ventricle  SUMMARY:  -  Stress results: There was resting hypertension with an appropriate blood pressure response to stress  There was no chest pain during stress  -  ECG conclusions: The stress ECG was equivocal for ischemia  -  Perfusion imaging: There was a moderate to large, severe, partially reversible myocardial perfusion defect of the entire inferolateral and inferoapical wall  -  Gated SPECT: The calculated left ventricular ejection fraction was 43 %  Left ventricular ejection fraction was mildly decreased by visual estimate  There was moderately reduced myocardial thickening and motion of the lateral wall and  inferior wall of the left ventricle  -  Impressions and recommendations: Left ventricular systolic function was reduced, with regional wall motion abnormalities  Ef 43%  Pt was in atrial Fib during study  IMPRESSIONS: Abnormal study after pharmacologic vasodilation  There was a moderate-sized infarct and a moderate amount of ischemia in the distribution of right coronary artery or the left circumflex coronary artery  Left ventricular  systolic function was reduced, with regional wall motion abnormalities  Ef 43%  Pt was in atrial Fib during study  Prepared and signed by    Radha De Guzman MD  Signed 2019 12:01:54           Pedro Jackman  Please call with any questions or suggestions    A description of the counselin min abnormal stress test, consent, catheterization  Goals and Barriers:  Patient's ability to self care:  Medication side effect reviewed with patient in detail and all their questions answered  "This note has been constructed using a voice recognition system  Therefore there may be syntax, spelling, and/or grammatical errors   Please call if you have any questions  "

## 2019-03-01 ENCOUNTER — ANESTHESIA EVENT (OUTPATIENT)
Dept: NON INVASIVE DIAGNOSTICS | Facility: HOSPITAL | Age: 49
End: 2019-03-01

## 2019-03-01 ENCOUNTER — ANESTHESIA (OUTPATIENT)
Dept: NON INVASIVE DIAGNOSTICS | Facility: HOSPITAL | Age: 49
End: 2019-03-01

## 2019-03-01 ENCOUNTER — HOSPITAL ENCOUNTER (OUTPATIENT)
Dept: NON INVASIVE DIAGNOSTICS | Facility: HOSPITAL | Age: 49
Discharge: HOME | End: 2019-03-01
Admitting: INTERNAL MEDICINE
Payer: COMMERCIAL

## 2019-03-01 VITALS
RESPIRATION RATE: 18 BRPM | DIASTOLIC BLOOD PRESSURE: 76 MMHG | OXYGEN SATURATION: 95 % | HEART RATE: 71 BPM | SYSTOLIC BLOOD PRESSURE: 109 MMHG

## 2019-03-01 DIAGNOSIS — I48.19 PERSISTENT ATRIAL FIBRILLATION (HCC): ICD-10-CM

## 2019-03-01 DIAGNOSIS — I50.33 ACUTE ON CHRONIC DIASTOLIC CHF (CONGESTIVE HEART FAILURE) (HCC): ICD-10-CM

## 2019-03-01 PROCEDURE — 92960 CARDIOVERSION ELECTRIC EXT: CPT | Performed by: INTERNAL MEDICINE

## 2019-03-01 PROCEDURE — 92960 CARDIOVERSION ELECTRIC EXT: CPT

## 2019-03-01 RX ORDER — PROPOFOL 10 MG/ML
INJECTION, EMULSION INTRAVENOUS AS NEEDED
Status: DISCONTINUED | OUTPATIENT
Start: 2019-03-01 | End: 2019-03-01 | Stop reason: SURG

## 2019-03-01 RX ORDER — SODIUM CHLORIDE 9 MG/ML
INJECTION, SOLUTION INTRAVENOUS CONTINUOUS PRN
Status: DISCONTINUED | OUTPATIENT
Start: 2019-03-01 | End: 2019-03-01 | Stop reason: SURG

## 2019-03-01 RX ORDER — SODIUM CHLORIDE 9 MG/ML
INJECTION, SOLUTION INTRAVENOUS
Status: COMPLETED | OUTPATIENT
Start: 2019-03-01 | End: 2019-03-01

## 2019-03-01 RX ORDER — LIDOCAINE HYDROCHLORIDE 10 MG/ML
INJECTION, SOLUTION INFILTRATION; PERINEURAL AS NEEDED
Status: DISCONTINUED | OUTPATIENT
Start: 2019-03-01 | End: 2019-03-01 | Stop reason: SURG

## 2019-03-01 RX ADMIN — PROPOFOL 100 MG: 10 INJECTION, EMULSION INTRAVENOUS at 07:40

## 2019-03-01 RX ADMIN — LIDOCAINE HYDROCHLORIDE 50 MG: 10 INJECTION, SOLUTION INFILTRATION; PERINEURAL at 07:40

## 2019-03-01 RX ADMIN — PROPOFOL 30 MG: 10 INJECTION, EMULSION INTRAVENOUS at 07:41

## 2019-03-01 RX ADMIN — Medication 50 ML/HR: at 07:10

## 2019-03-01 RX ADMIN — SODIUM CHLORIDE: 0.9 INJECTION, SOLUTION INTRAVENOUS at 07:32

## 2019-03-01 NOTE — SEDATION DOCUMENTATION
Elective Cardioversion successful  Patient instructed to continue to take his Eliquis 2x/day and Tambacor 2x/day  Verbalizes understanding and D/C home in stable condition with his wife

## 2019-03-01 NOTE — ANESTHESIA PREPROCEDURE EVALUATION
Review of Systems/Medical History  Patient summary reviewed  Chart reviewed  No history of anesthetic complications     Cardiovascular  EKG reviewed, Exercise tolerance (METS): >4,  Hyperlipidemia, Hypertension controlled, Dysrhythmias , atrial fibrillation,   Comment: SUMMARY     LEFT VENTRICLE:  Definity given for improved LV definition  Systolic function was at the lower limits of normal  Ejection fraction was estimated in the range of 50 % to 55 % to be 55 %  There were no regional wall motion abnormalities  Wall thickness was mildly increased      LEFT ATRIUM:  The atrium was moderately dilated based on linear measurement of 4 4cm and index area of 35 ml/m2     RIGHT ATRIUM:  The atrium was mildly dilated based on right atrial area measurement of 22 cm2     MITRAL VALVE:  There was trace regurgitation      TRICUSPID VALVE:  The tricuspid jet envelope definition was inadequate for estimation of RV systolic pressure  There are no indirect findings (abnormal RV volume or geometry, altered pulmonary flow velocity profile, or leftward septal displacement) which  would suggest moderate or severe pulmonary hypertension      HISTORY: PRIOR HISTORY: HTN,Hyperlipidemia,Asthma ,  Pulmonary  Asthma , well controlled/ stable ,   Comment: Off meds by pulmonologist      GI/Hepatic    GERD well controlled,        Negative  ROS        Endo/Other  Negative endo/other ROS      GYN       Hematology    Coagulation disorder currently taking oral anticoagulants,   Comment: Anticoagulated  Musculoskeletal  Negative musculoskeletal ROS        Neurology  Negative neurology ROS      Psychology   Negative psychology ROS     Comment: 5-6 beers nightly         Physical Exam    Airway    Mallampati score:  I  TM Distance: >3 FB  Neck ROM: full     Dental   No notable dental hx     Cardiovascular  Rhythm: irregular, Rate: abnormal,     Pulmonary  Breath sounds clear to auscultation,     Other Findings        Anesthesia Plan  ASA Score- 3     Anesthesia Type- IV sedation with anesthesia with ASA Monitors  Additional Monitors:   Airway Plan:         Plan Factors-Patient not instructed to abstain from smoking on day of procedure       Induction- intravenous  Postoperative Plan-     Informed Consent- Anesthetic plan and risks discussed with patient

## 2019-03-01 NOTE — ANESTHESIA POSTPROCEDURE EVALUATION
Post-Op Assessment Note    CV Status:  Stable  Pain Score: 0    Pain management: adequate     Mental Status:  Alert and awake   Hydration Status:  Euvolemic   PONV Controlled:  Controlled   Airway Patency:  Patent   Post Op Vitals Reviewed: Yes      Staff: CRNA           /88 (03/01/19 0748)    Temp      Pulse 68 (03/01/19 0748)   Resp 14 (03/01/19 0748)    SpO2 97 % (03/01/19 0748)

## 2019-03-08 ENCOUNTER — OFFICE VISIT (OUTPATIENT)
Dept: CARDIOLOGY CLINIC | Facility: CLINIC | Age: 49
End: 2019-03-08
Payer: COMMERCIAL

## 2019-03-08 VITALS
DIASTOLIC BLOOD PRESSURE: 80 MMHG | OXYGEN SATURATION: 98 % | HEART RATE: 81 BPM | HEIGHT: 73 IN | BODY MASS INDEX: 29.97 KG/M2 | WEIGHT: 226.1 LBS | SYSTOLIC BLOOD PRESSURE: 120 MMHG

## 2019-03-08 DIAGNOSIS — I50.33 ACUTE ON CHRONIC DIASTOLIC CHF (CONGESTIVE HEART FAILURE) (HCC): ICD-10-CM

## 2019-03-08 DIAGNOSIS — R94.31 ABNORMAL EKG: ICD-10-CM

## 2019-03-08 DIAGNOSIS — I48.19 PERSISTENT ATRIAL FIBRILLATION (HCC): ICD-10-CM

## 2019-03-08 DIAGNOSIS — R60.0 BILATERAL LEG EDEMA: ICD-10-CM

## 2019-03-08 DIAGNOSIS — R06.00 EXERTIONAL DYSPNEA: ICD-10-CM

## 2019-03-08 DIAGNOSIS — R94.39 ABNORMAL STRESS TEST: ICD-10-CM

## 2019-03-08 PROCEDURE — 93000 ELECTROCARDIOGRAM COMPLETE: CPT | Performed by: INTERNAL MEDICINE

## 2019-03-08 PROCEDURE — 99214 OFFICE O/P EST MOD 30 MIN: CPT | Performed by: INTERNAL MEDICINE

## 2019-03-08 RX ORDER — ASPIRIN 81 MG/1
81 TABLET, CHEWABLE ORAL DAILY
Qty: 180 TABLET | Refills: 3 | Status: SHIPPED | OUTPATIENT
Start: 2019-03-08 | End: 2020-03-09

## 2019-03-08 RX ORDER — POTASSIUM CHLORIDE 750 MG/1
10 TABLET, FILM COATED, EXTENDED RELEASE ORAL DAILY
Qty: 90 TABLET | Refills: 1 | Status: SHIPPED | OUTPATIENT
Start: 2019-03-08 | End: 2019-10-17

## 2019-03-08 RX ORDER — FUROSEMIDE 40 MG/1
TABLET ORAL
Qty: 90 TABLET | Refills: 1
Start: 2019-03-08 | End: 2019-10-17

## 2019-03-08 RX ORDER — METOPROLOL SUCCINATE 25 MG/1
25 TABLET, EXTENDED RELEASE ORAL
Qty: 90 TABLET | Refills: 3 | Status: SHIPPED | OUTPATIENT
Start: 2019-03-08 | End: 2019-10-17 | Stop reason: SDUPTHER

## 2019-03-08 RX ORDER — FLECAINIDE ACETATE 50 MG/1
50 TABLET ORAL 2 TIMES DAILY
Qty: 60 TABLET | Refills: 3 | Status: SHIPPED | OUTPATIENT
Start: 2019-03-08 | End: 2020-09-18 | Stop reason: SDUPTHER

## 2019-03-08 NOTE — PROGRESS NOTES
Cardiology Follow-up  Tj Montana  1970  [id number]  [address] [facility] [address] [address] [phone]      1  Exertional dyspnea  POCT ECG    metoprolol succinate (TOPROL-XL) 25 mg 24 hr tablet   2  Abnormal stress test  POCT ECG    metoprolol succinate (TOPROL-XL) 25 mg 24 hr tablet    apixaban (ELIQUIS) 5 mg   3  Persistent atrial fibrillation (HCC)  POCT ECG    flecainide (TAMBOCOR) 50 mg tablet    furosemide (LASIX) 40 mg tablet   4  Acute on chronic diastolic CHF (congestive heart failure) (HCC)  furosemide (LASIX) 40 mg tablet    potassium chloride (K-DUR) 10 mEq tablet   5  Bilateral leg edema  furosemide (LASIX) 40 mg tablet   6  Abnormal EKG  furosemide (LASIX) 40 mg tablet      Discussion/Plan:  Exertional dyspnea/abnormal stress test- cardiac catheterization negative for obstructive CAD  Abnormal pulmonary function test likely with component of COPD  Component may be secondary to the patient's atrial fibrillation  Given he has a normal EF without obstructive CAD we will start flecainide 50 mg twice a day plus continue metoprolol  Acute diastolic heart failure- possibly rate related  Continue furosemide 40 mg every other day Add metoprolol 25 mg daily  Will hold adding ACE-inhibitor  Plan for cardiac catheterization to rule out ischemic heart disease  Atrial fibrillation- He feels much better since cardioversion  currently on eliquis  Alcohol usage  Successful cardioversion continue metoprolol 25 mg daily plus flecainide 50 mg twice a day  [id number]  He will complete eliquis and switch to aspirin  CAD- aspirin + atorvastatin    Asthma    Alcohol abuse- cessation    Hld- continue atorvastatin    History:  64-year-old gentleman with no prior cardiac history presents with increased exertional shortness of breath found to be in atrial fibrillation    He also has had worsening lower extremity edema, PND and orthopnea  A nuclear stress test was completed which shows a inferior defect with moderate dc-infarct ischemia  He had an echo 2D which shows normal LV systolic function  He has been on a diuretic with improvement in his lower extremity edema but shortness of breath still remains  He is currently in rate controlled atrial fibrillation  He denies having any prior history of myocardial infarction or stroke  He is compliant with Eliquis without any significant bleeding  He currently denies having active chest pain  03/08/2019:  He has felt much better since his cardioversion  He feels like he has much better exercise stamina  He denies having lower extremity edema  Denies having PND orthopnea  Patient Active Problem List   Diagnosis    SOBOE (shortness of breath on exertion)    Atrial fibrillation ([facility])     Past Medical History:   Diagnosis Date    Asthma     [facility]     Hyperlipidemia     Hypertension      Social History     Socioeconomic History    Marital status: Significant Other     Spouse name: Not on file    Number of children: Not on file    Years of education: Not on file    Highest education level: Not on file   Occupational History    Not on file   Social Needs    Financial resource strain: Not on file    Food insecurity:     Worry: Not on file     Inability: Not on file    Transportation needs:     Medical: Not on file     Non-medical: Not on file   Tobacco Use    Smoking status: Never Smoker    Smokeless tobacco: Current User     Types: Chew    Tobacco comment: uses chewing tobacco   Substance and Sexual Activity    Alcohol use:  Yes     Alcohol/week: 1 8 oz     Types: 3 Standard drinks or equivalent per week     Frequency: 4 or more times a week     Drinks per session: 3 or 4    Drug use: No    Sexual activity: Not on file   Lifestyle    Physical activity:     Days per week: Not on file     Minutes per session: Not on file    Stress: Not on file   Relationships    Social connections:     Talks on phone: Not on file     Gets together: Not on file     Attends [employer] service: Not on file     Active member of club or organization: Not on file     Attends meetings of clubs or organizations: Not on file     Relationship status: Not on file    Intimate partner violence:     Fear of current or ex partner: Not on file     Emotionally abused: Not on file     Physically abused: Not on file     Forced sexual activity: Not on file   Other Topics Concern    Not on file   Social History Narrative    Not on file      Family History   Problem Relation Age of Onset    No Known Problems Mother     Prostate cancer Father      History reviewed  No pertinent surgical history  Current Outpatient Medications:     apixaban (ELIQUIS) 5 mg, Take 1 tablet (5 mg total) by mouth 2 (two) times a day, Disp: 180 tablet, Rfl: 3    atorvastatin (LIPITOR) 20 mg tablet, Take 20 mg by mouth daily, Disp: , Rfl:     flecainide (TAMBOCOR) 50 mg tablet, Take 1 tablet (50 mg total) by mouth 2 (two) times a day, Disp: 60 tablet, Rfl: 3    furosemide (LASIX) 40 mg tablet, Take half tablet every other day, Disp: 90 tablet, Rfl: 1    lansoprazole (PREVACID) 30 mg capsule, Take 30 mg by mouth daily  , Disp: , Rfl:     melatonin 1 mg, Take 1 mg by mouth daily at bedtime, Disp: , Rfl:     metoprolol succinate (TOPROL-XL) 25 mg 24 hr tablet, Take 1 tablet (25 mg total) by mouth daily in the early morning, Disp: 90 tablet, Rfl: 3    Mometasone Furo-Formoterol Fum (DULERA) 200-5 MCG/ACT AERO, Inhale, Disp: , Rfl:     potassium chloride (K-DUR) 10 mEq tablet, Take 1 tablet (10 mEq total) by mouth daily, Disp: 90 tablet, Rfl: 1  No Known Allergies    Review of Systems:  Review of Systems   Constitutional: Negative  HENT: Negative  Eyes: Negative  Respiratory: Positive for shortness of breath      Cardiovascular: Positive for chest pain  Negative for leg swelling  Gastrointestinal: Negative  Endocrine: Negative  Genitourinary: Negative  Musculoskeletal: Negative  Skin: Negative  Allergic/Immunologic: Negative  Neurological: Negative  Hematological: Negative  Psychiatric/Behavioral: Negative  Vitals:    03/08/19 1444   BP: 120/80   BP Location: Right arm   Patient Position: Sitting   Cuff Size: Large   Pulse: 81   SpO2: 98%   Weight: 103 kg (226 lb 1 6 oz)   Height: 6' 1" (1 854 m)     Physical Exam:  Physical Exam   Constitutional: He is oriented to person, place, and time  No distress  HENT:   Head: Normocephalic and atraumatic  Right Ear: External ear normal    Left Ear: External ear normal    Eyes: Pupils are equal, round, and reactive to light  Conjunctivae are normal  Right eye exhibits no discharge  Left eye exhibits no discharge  No scleral icterus  Neck: Normal range of motion  Neck supple  No JVD present  No tracheal deviation present  No thyromegaly present  Cardiovascular: Regular rhythm  Exam reveals gallop  Exam reveals no friction rub  No murmur heard  Pulmonary/Chest: Effort normal and breath sounds normal  No stridor  No respiratory distress  He has no wheezes  He has no rales  He exhibits no tenderness  Abdominal: Soft  Bowel sounds are normal  He exhibits distension  He exhibits no mass  There is no tenderness  There is no rebound and no guarding  Musculoskeletal: Normal range of motion  He exhibits edema  He exhibits no tenderness or deformity  Neurological: He is alert and oriented to person, place, and time  He has normal reflexes  No cranial nerve deficit  He exhibits normal muscle tone  Coordination normal    Skin: Skin is warm and dry  No rash noted  He is not diaphoretic  No erythema  No pallor  Psychiatric: He has a normal mood and affect  His behavior is normal  Judgment and thought content normal    Nursing note and vitals reviewed        Labs:     Lab Results Component Value Date    WBC 6 20 2016    HGB 14 8 2016    HCT 43 0 2016    MCV 92 2016     2016     Lab Results   Component Value Date    K 3 7 2016    CL 99 (L) 2016    CO2 23 2016    BUN 17 2016    CREATININE 0 98 2016    CALCIUM 8 7 2016    AST 32 2016    ALT 56 2016    ALKPHOS 82 2016    EGFR >60 0 2016     No results found for: CHOL  No results found for: HDL  No results found for: LDLCALC  No results found for: TRIG  No results found for: HGBA1C    Imaging & Testing   I have personally reviewed pertinent reports  EKG: Personally reviewed  Normal sinus rhythm inferior-t wave inversions    Cardiac testing:   Results for orders placed during the hospital encounter of 19   Echo complete with contrast if indicated    Narrative [address]  [address] [address]  [phone]    Transthoracic Echocardiogram  2D, M-mode, Doppler, and Color Doppler    Study date:  2019    Patient: Mellissa Sanchez  MR number: [id number]  Account number: [de-identified]  : 1970  Age: 46 years  Gender: Male  Status: Outpatient  Location: Echo lab  Height: 72 in  Weight: 209 7 lb  BP: 129/ 82 mmHg    Indications: atrial fibrillation    Diagnoses: I48 0 - Atrial fibrillation    Sonographer:  CARROLL Burnett  Primary Physician:  Nicholos Boas, DO  Referring Physician:  Jorge Luis Lucas MD  Group:  [id number]  Interpreting Physician:  Jennifer Valdes MD    SUMMARY    LEFT VENTRICLE:  Definity given for improved LV definition  Systolic function was at the lower limits of normal  Ejection fraction was estimated in the range of 50 % to 55 % to be 55 %  There were no regional wall motion abnormalities  Wall thickness was mildly increased      LEFT ATRIUM:  The atrium was moderately dilated based on linear measurement of 4 4cm and index area of 35 ml/m2    RIGHT ATRIUM:  The atrium was mildly dilated based on right atrial area measurement of 22 cm2    MITRAL VALVE:  There was trace regurgitation  TRICUSPID VALVE:  The tricuspid jet envelope definition was inadequate for estimation of RV systolic pressure  There are no indirect findings (abnormal RV volume or geometry, altered pulmonary flow velocity profile, or leftward septal displacement) which  would suggest moderate or severe pulmonary hypertension  HISTORY: PRIOR HISTORY: HTN,Hyperlipidemia,Asthma  PROCEDURE: The procedure was performed in the echo lab  This was a routine study  The transthoracic approach was used  The study included complete 2D imaging, M-mode, complete spectral Doppler, and color Doppler  The heart rate was 105  bpm, at the start of the study  Images were obtained from the parasternal, apical, subcostal, and suprasternal notch acoustic windows  Intravenous contrast (Definity solution [1 3 ml Definity/8 7ml normal saline solution], 5 ml) was  administered to evaluate myocardial perfusion and opacify the left ventricle  Echocardiographic views were limited due to poor acoustic window availability, decreased penetration, and lung interference  This was a technically difficult  study  LEFT VENTRICLE: Definity given for improved LV definition Size was normal  Systolic function was at the lower limits of normal  Ejection fraction was estimated in the range of 50 % to 55 % to be 55 %  There were no regional wall motion  abnormalities  Wall thickness was mildly increased  No evidence of apical thrombus  DOPPLER: The study was not technically sufficient to allow evaluation of LV diastolic function      RIGHT VENTRICLE: The size was normal  Systolic function was normal  Wall thickness was normal     LEFT ATRIUM: The atrium was moderately dilated based on linear measurement of 4 4cm and index area of 35 ml/m2    RIGHT ATRIUM: The atrium was mildly dilated based on right atrial area measurement of 22 cm2    MITRAL VALVE: There was mild thickening  There was normal leaflet separation  DOPPLER: The transmitral velocity was within the normal range  There was no evidence for stenosis  There was trace regurgitation  AORTIC VALVE: The valve was probably trileaflet  Leaflets exhibited mildly increased thickness and normal cuspal separation  DOPPLER: Transaortic velocity was within the normal range  There was no evidence for stenosis  There was no  significant regurgitation  TRICUSPID VALVE: The valve structure was normal  There was normal leaflet separation  DOPPLER: The transtricuspid velocity was within the normal range  There was no evidence for stenosis  There was no significant regurgitation  The  tricuspid jet envelope definition was inadequate for estimation of RV systolic pressure  There are no indirect findings (abnormal RV volume or geometry, altered pulmonary flow velocity profile, or leftward septal displacement) which would  suggest moderate or severe pulmonary hypertension  PULMONIC VALVE: Leaflets exhibited normal thickness, no calcification, and normal cuspal separation  DOPPLER: The transpulmonic velocity was within the normal range  There was no significant regurgitation  PERICARDIUM: There was no pericardial effusion  The pericardium was normal in appearance  AORTA: The root exhibited normal size  SYSTEMIC VEINS: IVC: The inferior vena cava was normal in size  SYSTEM MEASUREMENT TABLES    2D mode  AoR Diam 2D: 3 6 cm  LA Diam (2D): 4 4 cm  LA/Ao (2D): 1 22  FS (2D Teich): 25 9 %  IVSd (2D): 1 2 cm  LVDEV: 113 cm³  LVESV: 55 5 cm³  LVIDd(2D): 4 9 cm  LVISd (2D): 3 63 cm  LVOT Area 2D: 3 14 cm squared  LVPWd (2D): 1 24 cm  SV (Teich): 57 5 cm³    Unspecified Scan Mode  DHARA Cont Eq (Peak Jason): 2 36 cm squared  LVOT Diam : 2 cm  LVOT Vmax: 775 mm/s  LVOT Vmax; Mean: 775 mm/s  Peak Grad ; Mean: 2 mm[Hg]  MV Peak E Jason   Mean: 780 mm/s  MVA (PHT): 4 cm squared  PHT: 55 ms  RA Area: 22 3 cm squared  RA Volume: 62 7 cm³  TAPSE: 1 6 cm    [facility] Accredited [facility]chocardiography Laboratory    Prepared and electronically signed by    Wallace Delong MD  Signed 2019 11:16:34       No results found for this or any previous visit  No results found for this or any previous visit  Results for orders placed during the hospital encounter of 19   NM myocardial perfusion spect (stress and/or rest)    [facility] [address]  [address] [facility]   [phone]    Rest/Stress Gated SPECT Myocardial Perfusion Imaging After Regadenoson    Patient: Aristides Carl  MR number: [id number]  Account number: [de-identified]  : 1970  Age: 46 years  Gender: Male  Status: Outpatient  Location: Stress lab  Height: 72 in  Weight: 210 lb  BP: 156/ 86 mmHg    Allergies: NO KNOWN ALLERGIES    Diagnosis: I50 9 - Heart failure, unspecified    Primary Physician:  Bertha Magallon DO  Technician:  Patric Ramires  RN:  ELIER Calvo  Referring Physician:  Hailee Vizcaino MD  Group:  [facility]  Report Prepared By[de-identified]  ELIER Calvo  Interpreting Physician:  Duran Darby MD    INDICATIONS: Evaluation for coronary artery disease  HISTORY: The patient is a 46year old  male  Chest pain status: no chest pain  Other symptoms: dyspnea  Coronary artery disease risk factors: dyslipidemia and hypertension  Cardiovascular history: arrhythmia  Co-morbidity: history  of lung disease  Medications:an anticoagulant, a calcium channel blocker, a diuretic, clopidogrel, and an antihypertensive agent  PHYSICAL EXAM: Baseline physical exam screening: normal and no wheezes audible  REST ECG: Atrial fibrillation  PROCEDURE: The study was performed in the the Stress lab  A regadenoson infusion pharmacologic stress test was performed   Gated SPECT myocardial perfusion imaging was performed after stress and at rest  Systolic blood pressure was 156  mmHg, at the start of the study  Diastolic blood pressure was 86 mmHg, at the start of the study  The heart rate was 117 bpm, at the start of the study  IV double checked  Regadenoson protocol:  Time HR bpm SBP mmHg DBP mmHg Symptoms Rhythm/conduct  Baseline 08:55 117 156 86 none A-fib, rare PVC's  Immediate 09:05 134 174 86 mild dyspnea same as above  1 min 09:50 126 148 84 same as above --  2 min 09:51 123 150 84 same as above --  3 min 09:50 110 152 84 subsiding --  No medications or fluids given  The patient also performed low level exercise  STRESS SUMMARY: Duration of pharmacologic stress was 3 min  Maximal heart rate during stress was 150 bpm  The heart rate response to stress was normal  There was resting hypertension with an appropriate blood pressure response to stress  The rate-pressure product for the peak heart rate and blood pressure was 13112  There was no chest pain during stress  The stress test was terminated due to protocol completion  Pre oxygen saturation: 96 %  Peak oxygen saturation: 98 %  The stress ECG was equivocal for ischemia  Arrhythmia during stress: isolated premature ventricular beats  ISOTOPE ADMINISTRATION:  Resting isotope administration Stress isotope administration  Agent Tetrofosmin Tetrofosmin  Date 01/21/2019 01/21/2019    The radiopharmaceutical was injected at the peak effect of pharmacologic stress  MYOCARDIAL PERFUSION IMAGING:  Left ventricular size was normal  The TID ratio was   86  PERFUSION DEFECTS:  -  There was a moderate to large, severe, partially reversible myocardial perfusion defect of the entire inferolateral and inferoapical wall  GATED SPECT:  The calculated left ventricular ejection fraction was 43 %  Left ventricular ejection fraction was mildly decreased by visual estimate  There was moderately reduced myocardial thickening and motion of the lateral wall and inferior wall of  the left ventricle      SUMMARY:  -  Stress results: There was resting hypertension with an appropriate blood pressure response to stress  There was no chest pain during stress  -  ECG conclusions: The stress ECG was equivocal for ischemia  -  Perfusion imaging: There was a moderate to large, severe, partially reversible myocardial perfusion defect of the entire inferolateral and inferoapical wall  -  Gated SPECT: The calculated left ventricular ejection fraction was 43 %  Left ventricular ejection fraction was mildly decreased by visual estimate  There was moderately reduced myocardial thickening and motion of the lateral wall and  inferior wall of the left ventricle  -  Impressions and recommendations: Left ventricular systolic function was reduced, with regional wall motion abnormalities  Ef 43%  Pt was in atrial Fib during study  IMPRESSIONS: Abnormal study after pharmacologic vasodilation  There was a moderate-sized infarct and a moderate amount of ischemia in the distribution of right coronary artery or the left circumflex coronary artery  Left ventricular  systolic function was reduced, with regional wall motion abnormalities  Ef 43%  Pt was in atrial Fib during study  Prepared and signed by    Randy Almaraz MD  Signed 2019 12:01:54           [facility]  Please call with any questions or suggestions    A description of the counselin min abnormal stress test, consent, catheterization  Goals and Barriers:  Patient's ability to self care:  Medication side effect reviewed with patient in detail and all their questions answered  "This note has been constructed using a voice recognition system  Therefore there may be syntax, spelling, and/or grammatical errors   Please call if you have any questions  "

## 2019-07-06 DIAGNOSIS — I48.19 PERSISTENT ATRIAL FIBRILLATION (HCC): ICD-10-CM

## 2019-07-08 RX ORDER — FLECAINIDE ACETATE 50 MG/1
TABLET ORAL
Qty: 60 TABLET | Refills: 3 | Status: SHIPPED | OUTPATIENT
Start: 2019-07-08 | End: 2019-10-17 | Stop reason: SDUPTHER

## 2019-09-25 DIAGNOSIS — E78.2 MIXED HYPERLIPIDEMIA: Primary | ICD-10-CM

## 2019-09-25 RX ORDER — ATORVASTATIN CALCIUM 20 MG/1
20 TABLET, FILM COATED ORAL DAILY
Qty: 90 TABLET | Refills: 0 | Status: SHIPPED | OUTPATIENT
Start: 2019-09-25 | End: 2019-10-17

## 2019-10-04 ENCOUNTER — TELEPHONE (OUTPATIENT)
Dept: CARDIOLOGY CLINIC | Facility: CLINIC | Age: 49
End: 2019-10-04

## 2019-10-04 LAB
ALBUMIN SERPL-MCNC: 4.5 G/DL (ref 3.5–5.5)
ALBUMIN/GLOB SERPL: 2 {RATIO} (ref 1.2–2.2)
ALP SERPL-CCNC: 73 IU/L (ref 39–117)
ALT SERPL-CCNC: 38 IU/L (ref 0–44)
AST SERPL-CCNC: 25 IU/L (ref 0–40)
BILIRUB SERPL-MCNC: 0.6 MG/DL (ref 0–1.2)
BUN SERPL-MCNC: 15 MG/DL (ref 6–24)
BUN/CREAT SERPL: 15 (ref 9–20)
CALCIUM SERPL-MCNC: 9.7 MG/DL (ref 8.7–10.2)
CHLORIDE SERPL-SCNC: 97 MMOL/L (ref 96–106)
CHOLEST SERPL-MCNC: 193 MG/DL (ref 100–199)
CO2 SERPL-SCNC: 24 MMOL/L (ref 20–29)
CREAT SERPL-MCNC: 0.97 MG/DL (ref 0.76–1.27)
GLOBULIN SER-MCNC: 2.2 G/DL (ref 1.5–4.5)
GLUCOSE SERPL-MCNC: 110 MG/DL (ref 65–99)
HDLC SERPL-MCNC: 49 MG/DL
LDLC SERPL CALC-MCNC: 110 MG/DL (ref 0–99)
NT-PROBNP SERPL-MCNC: 45 PG/ML (ref 0–121)
POTASSIUM SERPL-SCNC: 4.8 MMOL/L (ref 3.5–5.2)
PROT SERPL-MCNC: 6.7 G/DL (ref 6–8.5)
SL AMB EGFR AFRICAN AMERICAN: 104 ML/MIN/1.73
SL AMB EGFR NON AFRICAN AMERICAN: 90 ML/MIN/1.73
SODIUM SERPL-SCNC: 136 MMOL/L (ref 134–144)
TRIGL SERPL-MCNC: 168 MG/DL (ref 0–149)

## 2019-10-04 NOTE — TELEPHONE ENCOUNTER
----- Message from Kateri Skiff, MD sent at 10/4/2019  3:14 PM EDT -----  His kidney function stable    His triglycerides are mildly elevated

## 2019-10-17 ENCOUNTER — OFFICE VISIT (OUTPATIENT)
Dept: CARDIOLOGY CLINIC | Facility: CLINIC | Age: 49
End: 2019-10-17
Payer: COMMERCIAL

## 2019-10-17 VITALS
OXYGEN SATURATION: 97 % | DIASTOLIC BLOOD PRESSURE: 70 MMHG | WEIGHT: 223.1 LBS | HEART RATE: 95 BPM | HEIGHT: 73 IN | SYSTOLIC BLOOD PRESSURE: 128 MMHG | BODY MASS INDEX: 29.57 KG/M2

## 2019-10-17 DIAGNOSIS — I50.33 ACUTE ON CHRONIC DIASTOLIC CHF (CONGESTIVE HEART FAILURE) (HCC): ICD-10-CM

## 2019-10-17 DIAGNOSIS — R06.00 EXERTIONAL DYSPNEA: ICD-10-CM

## 2019-10-17 DIAGNOSIS — I48.19 PERSISTENT ATRIAL FIBRILLATION (HCC): Primary | ICD-10-CM

## 2019-10-17 DIAGNOSIS — R94.39 ABNORMAL STRESS TEST: ICD-10-CM

## 2019-10-17 DIAGNOSIS — R94.31 ABNORMAL EKG: ICD-10-CM

## 2019-10-17 DIAGNOSIS — I25.10 CAD IN NATIVE ARTERY: ICD-10-CM

## 2019-10-17 DIAGNOSIS — R60.0 BILATERAL LEG EDEMA: ICD-10-CM

## 2019-10-17 PROCEDURE — 93000 ELECTROCARDIOGRAM COMPLETE: CPT | Performed by: INTERNAL MEDICINE

## 2019-10-17 PROCEDURE — 99214 OFFICE O/P EST MOD 30 MIN: CPT | Performed by: INTERNAL MEDICINE

## 2019-10-17 RX ORDER — FLECAINIDE ACETATE 50 MG/1
50 TABLET ORAL 2 TIMES DAILY
Qty: 180 TABLET | Refills: 3 | Status: SHIPPED | OUTPATIENT
Start: 2019-10-17 | End: 2021-10-29 | Stop reason: SDUPTHER

## 2019-10-17 RX ORDER — ROSUVASTATIN CALCIUM 20 MG/1
20 TABLET, COATED ORAL
Qty: 90 TABLET | Refills: 3 | Status: SHIPPED | OUTPATIENT
Start: 2019-10-17 | End: 2020-04-20 | Stop reason: SDUPTHER

## 2019-10-17 RX ORDER — FUROSEMIDE 20 MG/1
20 TABLET ORAL DAILY
Qty: 90 TABLET | Refills: 3 | Status: SHIPPED | OUTPATIENT
Start: 2019-10-17 | End: 2019-10-17 | Stop reason: SDUPTHER

## 2019-10-17 RX ORDER — FUROSEMIDE 20 MG/1
20 TABLET ORAL DAILY
Qty: 90 TABLET | Refills: 3 | Status: SHIPPED | OUTPATIENT
Start: 2019-10-17 | End: 2020-09-18 | Stop reason: SDUPTHER

## 2019-10-17 RX ORDER — METOPROLOL SUCCINATE 25 MG/1
25 TABLET, EXTENDED RELEASE ORAL
Qty: 90 TABLET | Refills: 3 | Status: SHIPPED | OUTPATIENT
Start: 2019-10-17 | End: 2020-04-20 | Stop reason: SDUPTHER

## 2019-10-17 NOTE — PROGRESS NOTES
Cardiology Follow-up  [facility]  1970  [id number]  [facility] [address] [id number] [phone]      1  Persistent atrial fibrillation  POCT ECG    flecainide (TAMBOCOR) 50 mg tablet   2  Acute on chronic diastolic CHF (congestive heart failure) ([address] )     3  Bilateral leg edema  furosemide (LASIX) 20 mg tablet    DISCONTINUED: furosemide (LASIX) 20 mg tablet   4  Abnormal EKG     5  CAD in native artery  rosuvastatin (CRESTOR) 20 MG tablet   6  Exertional dyspnea  metoprolol succinate (TOPROL-XL) 25 mg 24 hr tablet   7  Abnormal stress test  metoprolol succinate (TOPROL-XL) 25 mg 24 hr tablet      Discussion/Plan:  Exertional dyspnea/abnormal stress test- cardiac catheterization negative for obstructive CAD  Abnormal pulmonary function test likely with component of COPD  Component may be secondary to the patient's atrial fibrillation  Given he has a normal EF without obstructive CAD we will start flecainide 50 mg twice a day plus continue metoprolol  Acute diastolic heart failure-   Continue furosemide 20very other day metoprolol 25 mg daily  Edema has been controlled    Atrial fibrillation- He feels much better since cardioversion  currently on eliquis  Alcohol usage  Successful cardioversion continue metoprolol 25 mg daily plus flecainide 50 mg twice a day  [id number]  He will complete eliquis and switch to aspirin  CAD- aspirin + rosuvastatin    Asthma    Alcohol abuse- cessation    Hld- continue atorvastatin    History:  77-year-old gentleman with no prior cardiac history presents with increased exertional shortness of breath found to be in atrial fibrillation  He also has had worsening lower extremity edema, PND and orthopnea  A nuclear stress test was completed which shows a inferior defect with moderate dc-infarct ischemia    He had an echo 2D which shows normal LV systolic function  He has been on a diuretic with improvement in his lower extremity edema but shortness of breath still remains  He is currently in rate controlled atrial fibrillation  He denies having any prior history of myocardial infarction or stroke  He is compliant with Eliquis without any significant bleeding  He currently denies having active chest pain  03/08/2019:  He has felt much better since his cardioversion  He feels like he has much better exercise stamina  He denies having lower extremity edema  Denies having PND orthopnea  10/17/2019: His weight has been controlled  His atrial fibrillation has been well suppressed  He denies feeling chest discomfort or shortness of breath  We reviewed through his lipids  His triglycerides are still mildly elevated  He will trying cut out more of the carbohydrate in his diet  Patient Active Problem List   Diagnosis    SOBOE (shortness of breath on exertion)    Atrial fibrillation (HCC)     Past Medical History:   Diagnosis Date    Asthma     Atrial fibrillation ([facility] [address] )     Hyperlipidemia     Hypertension      Social History     Socioeconomic History    Marital status: Significant Other     Spouse name: Not on file    Number of children: Not on file    Years of education: Not on file    Highest education level: Not on file   Occupational History    Not on file   Social Needs    Financial resource strain: Not on file    Food insecurity:     Worry: Not on file     Inability: Not on file    Transportation needs:     Medical: Not on file     Non-medical: Not on file   Tobacco Use    Smoking status: Never Smoker    Smokeless tobacco: Current User     Types: Chew    Tobacco comment: uses chewing tobacco   Substance and Sexual Activity    Alcohol use:  Yes     Alcohol/week: 3 0 standard drinks     Types: 3 Standard drinks or equivalent per week     Frequency: 4 or more times a week     Drinks per session: 3 or 4    Drug use: No    Sexual activity: Not on file   Lifestyle    Physical activity:     Days per week: Not on file     Minutes per session: Not on file    Stress: Not on file   Relationships    Social connections:     Talks on phone: Not on file     Gets together: Not on file     Attends Druze service: Not on file     Active member of club or organization: Not on file     Attends meetings of clubs or organizations: Not on file     Relationship status: Not on file    Intimate partner violence:     Fear of current or ex partner: Not on file     Emotionally abused: Not on file     Physically abused: Not on file     Forced sexual activity: Not on file   Other Topics Concern    Not on file   Social History Narrative    Not on file      Family History   Problem Relation Age of Onset    No Known Problems Mother     Prostate cancer Father      History reviewed  No pertinent surgical history  Current Outpatient Medications:     aspirin 81 mg chewable tablet, Chew 1 tablet (81 mg total) daily, Disp: 180 tablet, Rfl: 3    atorvastatin (LIPITOR) 20 mg tablet, Take 1 tablet (20 mg total) by mouth daily, Disp: 90 tablet, Rfl: 0    flecainide (TAMBOCOR) 50 mg tablet, take 1 tablet by mouth twice a day, Disp: 60 tablet, Rfl: 3    furosemide (LASIX) 40 mg tablet, Take half tablet every other day, Disp: 90 tablet, Rfl: 1    lansoprazole (PREVACID) 30 mg capsule, Take 30 mg by mouth daily  , Disp: , Rfl:     melatonin 1 mg, Take 1 mg by mouth daily at bedtime, Disp: , Rfl:     metoprolol succinate (TOPROL-XL) 25 mg 24 hr tablet, Take 1 tablet (25 mg total) by mouth daily in the early morning, Disp: 90 tablet, Rfl: 3    Mometasone Furo-Formoterol Fum (DULERA) 200-5 MCG/ACT AERO, Inhale, Disp: , Rfl:     flecainide (TAMBOCOR) 50 mg tablet, Take 1 tablet (50 mg total) by mouth 2 (two) times a day (Patient not taking: Reported on 10/17/2019), Disp: 60 tablet, Rfl: 3  No Known Allergies    Review of Systems:  Review of Systems   Constitutional: Negative  HENT: Negative  Eyes: Negative  Respiratory: Positive for shortness of breath  Cardiovascular: Positive for chest pain  Negative for leg swelling  Gastrointestinal: Negative  Endocrine: Negative  Genitourinary: Negative  Musculoskeletal: Negative  Skin: Negative  Allergic/Immunologic: Negative  Neurological: Negative  Hematological: Negative  Psychiatric/Behavioral: Negative  Vitals:    10/17/19 1406   BP: 128/70   BP Location: Right arm   Patient Position: Sitting   Cuff Size: Large   Pulse: 95   SpO2: 97%   Weight: 101 kg (223 lb 1 6 oz)   Height: 6' 1" (1 854 m)     Physical Exam:  Physical Exam   Constitutional: He is oriented to person, place, and time  No distress  HENT:   Head: Normocephalic and atraumatic  Right Ear: External ear normal    Left Ear: External ear normal    Eyes: Pupils are equal, round, and reactive to light  Conjunctivae are normal  Right eye exhibits no discharge  Left eye exhibits no discharge  No scleral icterus  Neck: Normal range of motion  Neck supple  No JVD present  No tracheal deviation present  No thyromegaly present  Cardiovascular: Regular rhythm  Exam reveals gallop  Exam reveals no friction rub  No murmur heard  Pulmonary/Chest: Effort normal and breath sounds normal  No stridor  No respiratory distress  He has no wheezes  He has no rales  He exhibits no tenderness  Abdominal: Soft  Bowel sounds are normal  He exhibits distension  He exhibits no mass  There is no tenderness  There is no rebound and no guarding  Musculoskeletal: Normal range of motion  He exhibits edema  He exhibits no tenderness or deformity  Neurological: He is alert and oriented to person, place, and time  He has normal reflexes  No cranial nerve deficit  He exhibits normal muscle tone  Coordination normal    Skin: Skin is warm and dry  No rash noted  He is not diaphoretic  No erythema  No pallor     Psychiatric: He has a normal mood and affect  His behavior is normal  Judgment and thought content normal    Nursing note and vitals reviewed  Labs:     Lab Results   Component Value Date    WBC 6 20 2016    HGB 14 8 2016    HCT 43 0 2016    MCV 92 2016     2016     Lab Results   Component Value Date    K 4 8 10/03/2019    CL 97 10/03/2019    CO2 24 10/03/2019    BUN 15 10/03/2019    CREATININE 0 97 10/03/2019    CALCIUM 8 7 2016    AST 25 10/03/2019    ALT 38 10/03/2019    ALKPHOS 82 2016    EGFR >60 0 2016     No results found for: CHOL  Lab Results   Component Value Date    HDL 49 10/03/2019     No results found for: [address]  Lab Results   Component Value Date    TRIG 168 (H) 10/03/2019     No results found for: HGBA1C    Imaging & Testing   I have personally reviewed pertinent reports  EKG: Personally reviewed      Normal sinus rhythm inferior-t wave inversions    Cardiac testing:   Results for orders placed during the hospital encounter of 19   Echo complete with contrast if indicated    Narrative [address]  [address] [address]  [phone]    Transthoracic Echocardiogram  2D, M-mode, Doppler, and Color Doppler    Study date:  2019    Patient: Chaz Griggs  MR number: [id number]  Account number: [de-identified]  : 1970  Age: 46 years  Gender: Male  Status: Outpatient  Location: Echo lab  Height: 72 in  Weight: 209 7 lb  BP: 129/ 82 mmHg    Indications: atrial fibrillation    Diagnoses: I48 0 - Atrial fibrillation    Sonographer:  CARROLL Santos  Primary Physician:  Cinthia Wan DO  Referring Physician:  Daniel Mariano MD  Group:  [facility]  Interpreting Physician:  Kenneth Maradiaga MD    SUMMARY    LEFT VENTRICLE:  Definity given for improved LV definition  Systolic function was at the lower limits of normal  Ejection fraction was estimated in the range of 50 % to 55 % to be 55 %  There were no regional wall motion abnormalities  Wall thickness was mildly increased  LEFT ATRIUM:  The atrium was moderately dilated based on linear measurement of 4 4cm and index area of 35 ml/m2    RIGHT ATRIUM:  The atrium was mildly dilated based on right atrial area measurement of 22 cm2    MITRAL VALVE:  There was trace regurgitation  TRICUSPID VALVE:  The tricuspid jet envelope definition was inadequate for estimation of RV systolic pressure  There are no indirect findings (abnormal RV volume or geometry, altered pulmonary flow velocity profile, or leftward septal displacement) which  would suggest moderate or severe pulmonary hypertension  HISTORY: PRIOR HISTORY: HTN,Hyperlipidemia,Asthma  PROCEDURE: The procedure was performed in the echo lab  This was a routine study  The transthoracic approach was used  The study included complete 2D imaging, M-mode, complete spectral Doppler, and color Doppler  The heart rate was 105  bpm, at the start of the study  Images were obtained from the parasternal, apical, subcostal, and suprasternal notch acoustic windows  Intravenous contrast (Definity solution [1 3 ml Definity/8 7ml normal saline solution], 5 ml) was  administered to evaluate myocardial perfusion and opacify the left ventricle  Echocardiographic views were limited due to poor acoustic window availability, decreased penetration, and lung interference  This was a technically difficult  study  LEFT VENTRICLE: Definity given for improved LV definition Size was normal  Systolic function was at the lower limits of normal  Ejection fraction was estimated in the range of 50 % to 55 % to be 55 %  There were no regional wall motion  abnormalities  Wall thickness was mildly increased  No evidence of apical thrombus  DOPPLER: The study was not technically sufficient to allow evaluation of LV diastolic function      RIGHT VENTRICLE: The size was normal  Systolic function was normal  Wall thickness was normal     LEFT ATRIUM: The atrium was moderately dilated based on linear measurement of 4 4cm and index area of 35 ml/m2    RIGHT ATRIUM: The atrium was mildly dilated based on right atrial area measurement of 22 cm2    MITRAL VALVE: There was mild thickening  There was normal leaflet separation  DOPPLER: The transmitral velocity was within the normal range  There was no evidence for stenosis  There was trace regurgitation  AORTIC VALVE: The valve was probably trileaflet  Leaflets exhibited mildly increased thickness and normal cuspal separation  DOPPLER: Transaortic velocity was within the normal range  There was no evidence for stenosis  There was no  significant regurgitation  TRICUSPID VALVE: The valve structure was normal  There was normal leaflet separation  DOPPLER: The transtricuspid velocity was within the normal range  There was no evidence for stenosis  There was no significant regurgitation  The  tricuspid jet envelope definition was inadequate for estimation of RV systolic pressure  There are no indirect findings (abnormal RV volume or geometry, altered pulmonary flow velocity profile, or leftward septal displacement) which would  suggest moderate or severe pulmonary hypertension  PULMONIC VALVE: Leaflets exhibited normal thickness, no calcification, and normal cuspal separation  DOPPLER: The transpulmonic velocity was within the normal range  There was no significant regurgitation  PERICARDIUM: There was no pericardial effusion  The pericardium was normal in appearance  AORTA: The root exhibited normal size  SYSTEMIC VEINS: IVC: The inferior vena cava was normal in size      SYSTEM MEASUREMENT TABLES    2D mode  AoR Diam 2D: 3 6 cm  LA Diam (2D): 4 4 cm  LA/Ao (2D): 1 22  FS (2D Teich): 25 9 %  IVSd (2D): 1 2 cm  LVDEV: 113 cm³  LVESV: 55 5 cm³  LVIDd(2D): 4 9 cm  LVISd (2D): 3 63 cm  LVOT Area 2D: 3 14 cm squared  LVPWd (2D): 1 24 cm  SV (Teich): 57 5 cm³    Unspecified Scan Mode  DHARA Cont Eq (Peak Jason): 2 36 cm squared  LVOT Diam : 2 cm  LVOT Vmax: 775 mm/s  LVOT Vmax; Mean: 775 mm/s  Peak Grad ; Mean: 2 mm[Hg]  MV Peak E Jason  Mean: 780 mm/s  MVA (PHT): 4 cm squared  PHT: 55 ms  RA Area: 22 3 cm squared  RA Volume: 62 7 cm³  TAPSE: 1 6 cm    [facility] Accredited Echocardiography Laboratory    Prepared and electronically signed by    Nick Barbosa MD  Signed 2019 11:16:34         Results for orders placed during the hospital encounter of 19   NM myocardial perfusion spect (stress and/or rest)    Narrative [address]  [address] [facility]   [phone]    Rest/Stress Gated SPECT Myocardial Perfusion Imaging After Regadenoson    Patient: Elsa Donahue  MR number: [id number]  Account number: [de-identified]  : 1970  Age: 46 years  Gender: Male  Status: Outpatient  Location: Stress lab  Height: 72 in  Weight: 210 lb  BP: 156/ 86 mmHg    Allergies: NO KNOWN ALLERGIES    Diagnosis: I50 9 - Heart failure, unspecified    Primary Physician:  Wallace Virk DO  Technician:  Rosanna Danielson  RN:  ELIER Marte  Referring Physician:  David Smith MD  Group:  [facility]all[facility]  Report Prepared By[de-identified]  ELIER Marte  Interpreting Physician:  Jyoti Burns MD    INDICATIONS: Evaluation for coronary artery disease  HISTORY: The patient is a 46year old  male  Chest pain status: no chest pain  Other symptoms: dyspnea  Coronary artery disease risk factors: dyslipidemia and hypertension  Cardiovascular history: arrhythmia  Co-morbidity: history  of lung disease  Medications:an anticoagulant, a calcium channel blocker, a diuretic, clopidogrel, and an antihypertensive agent  PHYSICAL EXAM: Baseline physical exam screening: normal and no wheezes audible  REST ECG: Atrial fibrillation  PROCEDURE: The study was performed in the the Stress lab   A regadenoson infusion pharmacologic stress test was performed  Gated SPECT myocardial perfusion imaging was performed after stress and at rest  Systolic blood pressure was 156  mmHg, at the start of the study  Diastolic blood pressure was 86 mmHg, at the start of the study  The heart rate was 117 bpm, at the start of the study  IV double checked  Regadenoson protocol:  Time HR bpm SBP mmHg DBP mmHg Symptoms Rhythm/conduct  Baseline 08:55 117 156 86 none A-fib, rare PVC's  Immediate 09:05 134 174 86 mild dyspnea same as above  1 min 09:50 126 148 84 same as above --  2 min 09:51 123 150 84 same as above --  3 min 09:50 110 152 84 subsiding --  No medications or fluids given  The patient also performed low level exercise  STRESS SUMMARY: Duration of pharmacologic stress was 3 min  Maximal heart rate during stress was 150 bpm  The heart rate response to stress was normal  There was resting hypertension with an appropriate blood pressure response to stress  The rate-pressure product for the peak heart rate and blood pressure was 15290  There was no chest pain during stress  The stress test was terminated due to protocol completion  Pre oxygen saturation: 96 %  Peak oxygen saturation: 98 %  The stress ECG was equivocal for ischemia  Arrhythmia during stress: isolated premature ventricular beats  ISOTOPE ADMINISTRATION:  Resting isotope administration Stress isotope administration  Agent Tetrofosmin Tetrofosmin  Date 01/21/2019 01/21/2019    The radiopharmaceutical was injected at the peak effect of pharmacologic stress  MYOCARDIAL PERFUSION IMAGING:  Left ventricular size was normal  The TID ratio was   86  PERFUSION DEFECTS:  -  There was a moderate to large, severe, partially reversible myocardial perfusion defect of the entire inferolateral and inferoapical wall  GATED SPECT:  The calculated left ventricular ejection fraction was 43 %  Left ventricular ejection fraction was mildly decreased by visual estimate   There was moderately reduced myocardial thickening and motion of the lateral wall and inferior wall of  the left ventricle  SUMMARY:  -  Stress results: There was resting hypertension with an appropriate blood pressure response to stress  There was no chest pain during stress  -  ECG conclusions: The stress ECG was equivocal for ischemia  -  Perfusion imaging: There was a moderate to large, severe, partially reversible myocardial perfusion defect of the entire inferolateral and inferoapical wall  -  Gated SPECT: The calculated left ventricular ejection fraction was 43 %  Left ventricular ejection fraction was mildly decreased by visual estimate  There was moderately reduced myocardial thickening and motion of the lateral wall and  inferior wall of the left ventricle  -  Impressions and recommendations: Left ventricular systolic function was reduced, with regional wall motion abnormalities  Ef 43%  Pt was in atrial Fib during study  IMPRESSIONS: Abnormal study after pharmacologic vasodilation  There was a moderate-sized infarct and a moderate amount of ischemia in the distribution of right coronary artery or the left circumflex coronary artery  Left ventricular  systolic function was reduced, with regional wall motion abnormalities  Ef 43%  Pt was in atrial Fib during study  Prepared and signed by    Divina Ceja MD  Signed 2019 12:01:54       Mid LAD: There was a diffuse 30 % stenosis  Mid circumflex: There was a discrete 25 % stenosis  RCA: There was a tubular 25 % stenosis in the middle third of the vessel segment  Suly Jackman  Please call with any questions or suggestions    A description of the counselin min abnormal stress test, consent, catheterization  Goals and Barriers:  Patient's ability to self care:  Medication side effect reviewed with patient in detail and all their questions answered      "This note has been constructed using a voice recognition system  Therefore there may be syntax, spelling, and/or grammatical errors   Please call if you have any questions  "

## 2020-03-09 DIAGNOSIS — I48.19 PERSISTENT ATRIAL FIBRILLATION (HCC): ICD-10-CM

## 2020-03-09 RX ORDER — ACETAMINOPHEN 160 MG
TABLET,CHEWABLE ORAL
Qty: 180 TABLET | Refills: 3 | Status: SHIPPED | OUTPATIENT
Start: 2020-03-09 | End: 2020-04-20 | Stop reason: SDUPTHER

## 2020-04-20 ENCOUNTER — TELEMEDICINE (OUTPATIENT)
Dept: CARDIOLOGY CLINIC | Facility: CLINIC | Age: 50
End: 2020-04-20
Payer: COMMERCIAL

## 2020-04-20 DIAGNOSIS — R06.00 EXERTIONAL DYSPNEA: ICD-10-CM

## 2020-04-20 DIAGNOSIS — R94.39 ABNORMAL STRESS TEST: ICD-10-CM

## 2020-04-20 DIAGNOSIS — I48.19 PERSISTENT ATRIAL FIBRILLATION (HCC): ICD-10-CM

## 2020-04-20 DIAGNOSIS — I25.10 CAD IN NATIVE ARTERY: ICD-10-CM

## 2020-04-20 PROCEDURE — 99213 OFFICE O/P EST LOW 20 MIN: CPT | Performed by: INTERNAL MEDICINE

## 2020-04-20 RX ORDER — ASPIRIN 81 MG/1
81 TABLET, CHEWABLE ORAL DAILY
Qty: 180 TABLET | Refills: 3 | Status: SHIPPED | OUTPATIENT
Start: 2020-04-20 | End: 2021-03-15 | Stop reason: SDUPTHER

## 2020-04-20 RX ORDER — METOPROLOL SUCCINATE 25 MG/1
25 TABLET, EXTENDED RELEASE ORAL
Qty: 90 TABLET | Refills: 3 | Status: SHIPPED | OUTPATIENT
Start: 2020-04-20 | End: 2020-09-18 | Stop reason: SDUPTHER

## 2020-04-20 RX ORDER — ROSUVASTATIN CALCIUM 20 MG/1
20 TABLET, COATED ORAL
Qty: 90 TABLET | Refills: 3 | Status: SHIPPED | OUTPATIENT
Start: 2020-04-20 | End: 2020-09-18 | Stop reason: SDUPTHER

## 2020-09-18 ENCOUNTER — OFFICE VISIT (OUTPATIENT)
Dept: CARDIOLOGY CLINIC | Facility: CLINIC | Age: 50
End: 2020-09-18
Payer: COMMERCIAL

## 2020-09-18 VITALS
DIASTOLIC BLOOD PRESSURE: 82 MMHG | TEMPERATURE: 98.1 F | BODY MASS INDEX: 29.82 KG/M2 | WEIGHT: 225 LBS | HEIGHT: 73 IN | OXYGEN SATURATION: 97 % | HEART RATE: 76 BPM | SYSTOLIC BLOOD PRESSURE: 130 MMHG

## 2020-09-18 DIAGNOSIS — R94.39 ABNORMAL STRESS TEST: ICD-10-CM

## 2020-09-18 DIAGNOSIS — I25.10 CAD IN NATIVE ARTERY: ICD-10-CM

## 2020-09-18 DIAGNOSIS — I48.19 PERSISTENT ATRIAL FIBRILLATION (HCC): ICD-10-CM

## 2020-09-18 DIAGNOSIS — R60.0 BILATERAL LEG EDEMA: ICD-10-CM

## 2020-09-18 DIAGNOSIS — I48.91 ATRIAL FIBRILLATION, UNSPECIFIED TYPE (HCC): Primary | ICD-10-CM

## 2020-09-18 DIAGNOSIS — R06.00 EXERTIONAL DYSPNEA: ICD-10-CM

## 2020-09-18 PROCEDURE — 93000 ELECTROCARDIOGRAM COMPLETE: CPT | Performed by: INTERNAL MEDICINE

## 2020-09-18 PROCEDURE — 99214 OFFICE O/P EST MOD 30 MIN: CPT | Performed by: INTERNAL MEDICINE

## 2020-09-18 RX ORDER — FLECAINIDE ACETATE 50 MG/1
50 TABLET ORAL 2 TIMES DAILY
Qty: 60 TABLET | Refills: 3 | Status: SHIPPED | OUTPATIENT
Start: 2020-09-18 | End: 2021-03-15 | Stop reason: SDUPTHER

## 2020-09-18 RX ORDER — ROSUVASTATIN CALCIUM 20 MG/1
20 TABLET, COATED ORAL
Qty: 90 TABLET | Refills: 3 | Status: SHIPPED | OUTPATIENT
Start: 2020-09-18 | End: 2021-03-15 | Stop reason: SDUPTHER

## 2020-09-18 RX ORDER — FUROSEMIDE 20 MG/1
20 TABLET ORAL EVERY OTHER DAY
Qty: 90 TABLET | Refills: 3
Start: 2020-09-18 | End: 2021-02-16

## 2020-09-18 RX ORDER — METOPROLOL SUCCINATE 25 MG/1
25 TABLET, EXTENDED RELEASE ORAL
Qty: 90 TABLET | Refills: 3 | Status: SHIPPED | OUTPATIENT
Start: 2020-09-18 | End: 2021-03-15 | Stop reason: SDUPTHER

## 2020-09-18 NOTE — PATIENT INSTRUCTIONS
DASH Eating Plan   WHAT YOU NEED TO KNOW:   The DASH (Dietary Approaches to Stop Hypertension) Eating Plan is designed to help prevent or lower high blood pressure  It can also help to lower LDL (bad) cholesterol and decrease your risk of heart disease  The plan is low in sodium, sugar, unhealthy fats, and total fat  It is high in potassium, calcium, magnesium, and fiber  These nutrients are added when you eat more fruits, vegetables, and whole grains  DISCHARGE INSTRUCTIONS:   Your sodium limit each day: Your dietitian will tell you how much sodium is safe for you to have each day  People with high blood pressure should have no more than 1,500 to 2,300 mg of sodium in a day  A teaspoon (tsp) of salt has 2,300 mg of sodium  This may seem like a difficult goal, but small changes to the foods you eat can make a big difference  Your healthcare provider or dietitian can help you create a meal plan that follows your sodium limit  How to limit sodium:   · Read food labels  Food labels can help you choose foods that are low in sodium  The amount of sodium is listed in milligrams (mg)  The % Daily Value (DV) column tells you how much of your daily needs are met by 1 serving of the food for each nutrient listed  Choose foods that have less than 5% of the DV of sodium  These foods are considered low in sodium  Foods that have 20% or more of the DV of sodium are considered high in sodium  Avoid foods that have more than 300 mg of sodium in each serving  Choose foods that say low-sodium, reduced-sodium, or no salt added on the food label  · Avoid salt  Do not salt food at the table, and add very little salt to foods during cooking  Use herbs and spices, such as onions, garlic, and salt-free seasonings to add flavor to foods  Try lemon or lime juice or vinegar to give foods a tart flavor  Use hot peppers or a small amount of hot pepper sauce to add a spicy flavor to foods  · Ask about salt substitutes    Ask your healthcare provider if you may use salt substitutes  Some salt substitutes have ingredients that can be harmful if you have certain health conditions  · Choose foods carefully at restaurants  Meals from restaurants, especially fast food restaurants, are often high in sodium  Some restaurants have nutrition information that tells you the amount of sodium in their foods  Ask to have your food prepared with less, or no salt  What you need to know about fats:   · Include healthy fats  Examples are unsaturated fats and omega-3 fatty acids  Unsaturated fats are found in soybean, canola, olive, or sunflower oil, and liquid and soft tub margarines  Omega-3 fatty acids are found in fatty fish, such as salmon, tuna, mackerel, and sardines  It is also found in flaxseed oil and ground flaxseed  · Avoid unhealthy fats  Do not eat unhealthy fats, such as saturated fats and trans fats  Saturated fats are found in foods that contain fat from animals  Examples are fatty meats, whole milk, butter, cream, and other dairy foods  It is also found in shortening, stick margarine, palm oil, and coconut oil  Trans fats are found in fried foods, crackers, chips, and baked goods made with margarine or shortening  Foods to include: With the DASH eating plan, you need to eat a certain number of servings from each food group  This will help you get enough of certain nutrients and limit others  The amount of servings you should eat depends on how many calories you need  Your dietitian can tell you how many calories you need  The number of servings listed next to the food groups below are for people who need about 2,000 calories each day    · Grains:  6 to 8 servings (3 of these servings should be whole-grain foods)    ¨ 1 slice of whole-grain bread     ¨ 1 ounce of dry cereal    ¨ ½ cup of cooked cereal, pasta, or brown rice    · Vegetables and fruits:  4 to 5 servings of fruits and 4 to 5 servings of vegetables    ¨ 1 medium fruit    ¨ ½ cup of frozen, canned (no added salt), or chopped fresh vegetables     ¨ ½ cup of fresh, frozen, dried, or canned fruit (canned in light syrup or fruit juice)    ¨ ½ cup of vegetable or fruit juice    · Dairy:  2 to 3 servings    ¨ 1 cup of nonfat (skim) or 1% milk    ¨ 1½ ounces of fat-free or low-fat cheese    ¨ 6 ounces of nonfat or low-fat yogurt    · Lean meat, poultry, and fish:  6 ounces or less    [employer] (chicken, turkey) with no skin    ¨ Fish (especially fatty fish, such as salmon, fresh tuna, or mackerel)    ¨ Lean beef and pork (loin, round, extra lean hamburger)    ¨ Egg whites and egg substitutes    · Nuts, seeds, and legumes:  4 to 5 servings each week    ¨ ½ cup of cooked beans and peas    ¨ 1½ ounces of unsalted nuts    ¨ 2 tablespoons of peanut butter or seeds    · Sweets and added sugars:  5 or less each week    ¨ 1 tablespoon of sugar, jelly, or jam    ¨ ½ cup of sorbet or gelatin    ¨ 1 cup of lemonade    · Fats:  2 to 3 servings each week    ¨ 1 teaspoon of soft margarine or vegetable oil    ¨ 1 tablespoon of mayonnaise    ¨ 2 tablespoons of salad dressing  Foods to avoid:   · Grains:      [employer], such as doughnuts, pastries, cookies, and biscuits (high in fat and sugar)    ¨ Mixes for cornbread and biscuits, packaged foods, such as bread stuffing, rice and pasta mixes, macaroni and cheese, and instant cereals (high in sodium)    · Fruits and vegetables:      ¨ Regular, canned vegetables (high in sodium)    ¨ Sauerkraut, pickled vegetables, and other foods prepared in brine (high in sodium)    ¨ Fried vegetables or vegetables in butter or high-fat sauces    ¨ Fruit in cream or butter sauce (high in fat)    · Dairy:      ¨ Whole milk, 2% milk, and cream (high in fat)    ¨ Regular cheese and processed cheese (high in fat and sodium)    · Meats and protein foods:      ¨ Smoked or cured meat, such as corned beef, akhtar, ham, hot dogs, and sausage (high in fat and sodium)    ¨ Canned beans and canned meats or spreads, such as potted meats, sardines, anchovies, and imitation seafood (high in sodium)    ¨ Deli or lunch meats, such as bologna, ham, turkey, and roast beef (high in sodium)    ¨ High-fat meat (T-bone steak, regular hamburger, and ribs)    ¨ Whole eggs and egg yolks (high in fat)    · Other:      ¨ Seasonings made with salt, such as garlic salt, celery salt, onion salt, seasoned salt, meat tenderizers, and monosodium glutamate (MSG)    ¨ Miso soup and canned or dried soup mixes (high in sodium)    ¨ Regular soy sauce, barbecue sauce, teriyaki sauce, steak sauce, Worcestershire sauce, and most flavored vinegars (high in sodium)    ¨ Regular condiments, such as mustard, ketchup, and salad dressings (high in sodium)    ¨ Gravy and sauces, such as He or cheese sauces (high in sodium and fat)    ¨ Drinks high in sugar, such as soda or fruit drinks    [employer] foods, such as salted chips, popcorn, pretzels, pork rinds, salted crackers, and salted nuts    ¨ Frozen foods, such as dinners, entrees, vegetables with sauces, and breaded meats (high in sodium)  Other guidelines to follow:   · Maintain a healthy weight  Your risk for heart disease is higher if you are overweight  Your healthcare provider may suggest that you lose weight if you are overweight  You can lose weight by eating fewer calories and foods that have added sugars and fat  The DASH meal plan can help you do this  Decrease calories by eating smaller portions at each meal and fewer snacks  Ask your healthcare provider for more information about how to lose weight  · Exercise regularly  Regular exercise can help you reach or maintain a healthy weight  Regular exercise can also help decrease your blood pressure and improve your cholesterol levels  Get 30 minutes or more of moderate exercise each day of the week  To lose weight, get at least 60 minutes of exercise   Talk to your healthcare provider about the best exercise program for you  · Limit alcohol  Women should limit alcohol to 1 drink a day  Men should limit alcohol to 2 drinks a day  A drink of alcohol is 12 ounces of beer, 5 ounces of wine, or 1½ ounces of liquor  © 2017 [address] [employer] Information is for End User's use only and may not be sold, redistributed or otherwise used for commercial purposes  All illustrations and images included in CareNotes® are the copyrighted property of [employer]  or Pratik Lipscomb  The above information is an  only  It is not intended as medical advice for individual conditions or treatments  Talk to your doctor, nurse or pharmacist before following any medical regimen to see if it is safe and effective for you

## 2020-09-18 NOTE — PROGRESS NOTES
Cardiology Follow-up  [facility]  1970  [id number]  [address] [facility] [address] [address] [phone]      1  Atrial fibrillation, unspecified type ([address] )  POCT ECG    CBC    Comprehensive metabolic panel   2  Persistent atrial fibrillation (HCC)  flecainide (TAMBOCOR) 50 mg tablet   3  CAD in native artery  rosuvastatin (CRESTOR) 20 MG tablet   4  Bilateral leg edema  furosemide (LASIX) 20 mg tablet   5  Exertional dyspnea  metoprolol succinate (TOPROL-XL) 25 mg 24 hr tablet   6  Abnormal stress test  metoprolol succinate (TOPROL-XL) 25 mg 24 hr tablet      Discussion/Plan:  Atrial fibrillation- He feels much better since cardioversion  currently on eliquis  Alcohol usage  Successful cardioversion continue metoprolol 25 mg daily plus flecainide 50 mg twice a day  [id number]  Exertional dyspnea/abnormal stress test- cardiac catheterization negative for obstructive CAD  Abnormal pulmonary function test likely with component of COPD  Component may be secondary to the patient's atrial fibrillation  Given he has a normal EF without obstructive CAD we will start flecainide 50 mg twice a day plus continue metoprolol  Acute diastolic heart failure-   Continue furosemide 20mg every other day metoprolol 25 mg daily  Edema has been controlled    CAD- aspirin + rosuvastatin    Asthma    Alcohol abuse- cessation    Hld- continue atorvastatin    History:  40-year-old gentleman with no prior cardiac history presents with increased exertional shortness of breath found to be in atrial fibrillation  He also has had worsening lower extremity edema, PND and orthopnea  A nuclear stress test was completed which shows a inferior defect with moderate dc-infarct ischemia  He had an echo 2D which shows normal LV systolic function    He has been on a diuretic with improvement in his lower extremity edema but shortness of breath still remains  He is currently in rate controlled atrial fibrillation  He denies having any prior history of myocardial infarction or stroke  He is compliant with Eliquis without any significant bleeding  He currently denies having active chest pain  03/08/2019:  He has felt much better since his cardioversion  He feels like he has much better exercise stamina  He denies having lower extremity edema  Denies having PND orthopnea  10/17/2019: His weight has been controlled  His atrial fibrillation has been well suppressed  He denies feeling chest discomfort or shortness of breath  We reviewed through his lipids  His triglycerides are still mildly elevated  He will trying cut out more of the carbohydrate in his diet  09/18/2020:  He denies having any significant palpitations  He states lower extremity swelling has been well controlled  He is compliant with medications  Patient Active Problem List   Diagnosis    SOBOE (shortness of breath on exertion)    Atrial fibrillation ([facility])     Past Medical History:   Diagnosis Date    Asthma     Atrial fibrillation ([address] )     Hyperlipidemia     Hypertension      Social History     Socioeconomic History    Marital status: Significant Other     Spouse name: Not on file    Number of children: Not on file    Years of education: Not on file    Highest education level: Not on file   Occupational History    Not on file   Social Needs    Financial resource strain: Not on file    Food insecurity     Worry: Not on file     Inability: Not on file    Transportation needs     Medical: Not on file     Non-medical: Not on file   Tobacco Use    Smoking status: Never Smoker    Smokeless tobacco: Current User     Types: Chew    Tobacco comment: uses chewing tobacco   Substance and Sexual Activity    Alcohol use:  Yes     Alcohol/week: 3 0 standard drinks     Types: 3 Standard drinks or equivalent per week Frequency: 4 or more times a week     Drinks per session: 3 or 4    Drug use: No     Comment: Quit cocaine in 2012  - As per Natasha Self Sexual activity: Not on file   Lifestyle    Physical activity     Days per week: Not on file     Minutes per session: Not on file    Stress: Not on file   Relationships    Social connections     Talks on phone: Not on file     Gets together: Not on file     Attends [employer] service: Not on file     Active member of club or organization: Not on file     Attends meetings of clubs or organizations: Not on file     Relationship status: Not on file    Intimate partner violence     Fear of current or ex partner: Not on file     Emotionally abused: Not on file     Physically abused: Not on file     Forced sexual activity: Not on file   Other Topics Concern    Not on file   Social History Narrative    Not on file      Family History   Problem Relation Age of Onset    No Known Problems Mother     Prostate cancer Father      History reviewed  No pertinent surgical history  Current Outpatient Medications:     aspirin (RA Aspirin Adult Low Strength) 81 mg chewable tablet, Chew 1 tablet (81 mg total) daily Chew and swallow, Disp: 180 tablet, Rfl: 3    flecainide (TAMBOCOR) 50 mg tablet, Take 1 tablet (50 mg total) by mouth 2 (two) times a day, Disp: 60 tablet, Rfl: 3    furosemide (LASIX) 20 mg tablet, Take 1 tablet (20 mg total) by mouth every other day, Disp: 90 tablet, Rfl: 3    lansoprazole (PREVACID) 30 mg capsule, Take 30 mg by mouth daily  , Disp: , Rfl:     melatonin 1 mg, Take 3 mg by mouth daily at bedtime , Disp: , Rfl:     metoprolol succinate (TOPROL-XL) 25 mg 24 hr tablet, Take 1 tablet (25 mg total) by mouth daily in the early morning, Disp: 90 tablet, Rfl: 3    rosuvastatin (CRESTOR) 20 MG tablet, Take 1 tablet (20 mg total) by mouth daily at bedtime, Disp: 90 tablet, Rfl: 3    flecainide (TAMBOCOR) 50 mg tablet, Take 1 tablet (50 mg total) by mouth 2 (two) times a day (Patient not taking: Reported on 4/20/2020), Disp: 180 tablet, Rfl: 3  No Known Allergies    Review of Systems:  Review of Systems   Constitutional: Negative  HENT: Negative  Eyes: Negative  Cardiovascular: Negative for chest pain and leg swelling  Gastrointestinal: Negative  Endocrine: Negative  Genitourinary: Negative  Musculoskeletal: Negative  Skin: Negative  Allergic/Immunologic: Negative  Neurological: Negative  Hematological: Negative  Psychiatric/Behavioral: Negative  Vitals:    09/18/20 1455   BP: 130/82   BP Location: Right arm   Patient Position: Sitting   Cuff Size: Large   Pulse: 76   Temp: 98 1 °F (36 7 °C)   TempSrc: Temporal   SpO2: 97%   Weight: 102 kg (225 lb)   Height: 6' 1" (1 854 m)     Physical Exam:  Physical Exam   Constitutional: He is oriented to person, place, and time  He appears well-developed and well-nourished  No distress  HENT:   Head: Normocephalic and atraumatic  Right Ear: External ear normal    Left Ear: External ear normal    Eyes: Pupils are equal, round, and reactive to light  Conjunctivae are normal  Right eye exhibits no discharge  Left eye exhibits no discharge  No scleral icterus  Neck: Normal range of motion  Neck supple  No JVD present  No tracheal deviation present  No thyromegaly present  Cardiovascular: Normal rate and regular rhythm  Exam reveals gallop  Exam reveals no friction rub  No murmur heard  Pulmonary/Chest: Effort normal and breath sounds normal  No stridor  No respiratory distress  He has no wheezes  He has no rales  He exhibits no tenderness  Abdominal: Soft  Bowel sounds are normal  He exhibits distension  He exhibits no mass  There is no abdominal tenderness  There is no rebound and no guarding  Musculoskeletal: Normal range of motion  General: No tenderness, deformity or edema  Neurological: He is alert and oriented to person, place, and time  He has normal reflexes   No cranial nerve deficit  He exhibits normal muscle tone  Coordination normal    Skin: Skin is warm and dry  No rash noted  He is not diaphoretic  No erythema  No pallor  Psychiatric: He has a normal mood and affect  His behavior is normal  Judgment and thought content normal    Nursing note and vitals reviewed  Labs:     Lab Results   Component Value Date    WBC 6 20 2016    HGB 14 8 2016    HCT 43 0 2016    MCV 92 2016     2016     Lab Results   Component Value Date    K 4 8 10/03/2019    CL 97 10/03/2019    CO2 24 10/03/2019    BUN 15 10/03/2019    CREATININE 0 97 10/03/2019    CALCIUM 8 7 2016    AST 25 10/03/2019    ALT 38 10/03/2019    ALKPHOS 82 2016    EGFR >60 0 2016     No results found for: CHOL  Lab Results   Component Value Date    HDL 49 10/03/2019     Lab Results   Component Value Date    LDLCALC 110 (H) 10/03/2019     Lab Results   Component Value Date    TRIG 168 (H) 10/03/2019     No results found for: HGBA1C    Imaging & Testing   I have personally reviewed pertinent reports  EKG: Personally reviewed      Normal sinus rhythm inferior-t wave inversions    Cardiac testing:   Results for orders placed during the hospital encounter of 19   Echo complete with contrast if indicated    Narrative [address]  [address] [facility]  [phone]    Transthoracic Echocardiogram  2D, M-mode, Doppler, and Color Doppler    Study date:  2019    Patient: Eunice Deluca  MR number: [id number]  Account number: [de-identified]  : 1970  Age: 46 years  Gender: Male  Status: Outpatient  Location: Echo lab  Height: 72 in  Weight: 209 7 lb  BP: 129/ 82 mmHg    Indications: atrial fibrillation    Diagnoses: I48 0 - Atrial fibrillation    Sonographer:  CARROLL Blanco  Primary Physician:  Luis Miguel Knott DO  Referring Physician:  Mono Denis MD  Group:  [facility]  Interpreting Physician:  Zaria Encinas MD    SUMMARY    LEFT VENTRICLE:  Definity given for improved LV definition  Systolic function was at the lower limits of normal  Ejection fraction was estimated in the range of 50 % to 55 % to be 55 %  There were no regional wall motion abnormalities  Wall thickness was mildly increased  LEFT ATRIUM:  The atrium was moderately dilated based on linear measurement of 4 4cm and index area of 35 ml/m2    RIGHT ATRIUM:  The atrium was mildly dilated based on right atrial area measurement of 22 cm2    MITRAL VALVE:  There was trace regurgitation  TRICUSPID VALVE:  The tricuspid jet envelope definition was inadequate for estimation of RV systolic pressure  There are no indirect findings (abnormal RV volume or geometry, altered pulmonary flow velocity profile, or leftward septal displacement) which  would suggest moderate or severe pulmonary hypertension  HISTORY: PRIOR HISTORY: HTN,Hyperlipidemia,Asthma  PROCEDURE: The procedure was performed in the echo lab  This was a routine study  The transthoracic approach was used  The study included complete 2D imaging, M-mode, complete spectral Doppler, and color Doppler  The heart rate was 105  bpm, at the start of the study  Images were obtained from the parasternal, apical, subcostal, and suprasternal notch acoustic windows  Intravenous contrast (Definity solution [1 3 ml Definity/8 7ml normal saline solution], 5 ml) was  administered to evaluate myocardial perfusion and opacify the left ventricle  Echocardiographic views were limited due to poor acoustic window availability, decreased penetration, and lung interference  This was a technically difficult  study  LEFT VENTRICLE: Definity given for improved LV definition Size was normal  Systolic function was at the lower limits of normal  Ejection fraction was estimated in the range of 50 % to 55 % to be 55 %  There were no regional wall motion  abnormalities  Wall thickness was mildly increased  No evidence of apical thrombus  DOPPLER: The study was not technically sufficient to allow evaluation of LV diastolic function  RIGHT VENTRICLE: The size was normal  Systolic function was normal  Wall thickness was normal     LEFT ATRIUM: The atrium was moderately dilated based on linear measurement of 4 4cm and index area of 35 ml/m2    RIGHT ATRIUM: The atrium was mildly dilated based on right atrial area measurement of 22 cm2    MITRAL VALVE: There was mild thickening  There was normal leaflet separation  DOPPLER: The transmitral velocity was within the normal range  There was no evidence for stenosis  There was trace regurgitation  AORTIC VALVE: The valve was probably trileaflet  Leaflets exhibited mildly increased thickness and normal cuspal separation  DOPPLER: Transaortic velocity was within the normal range  There was no evidence for stenosis  There was no  significant regurgitation  TRICUSPID VALVE: The valve structure was normal  There was normal leaflet separation  DOPPLER: The transtricuspid velocity was within the normal range  There was no evidence for stenosis  There was no significant regurgitation  The  tricuspid jet envelope definition was inadequate for estimation of RV systolic pressure  There are no indirect findings (abnormal RV volume or geometry, altered pulmonary flow velocity profile, or leftward septal displacement) which would  suggest moderate or severe pulmonary hypertension  PULMONIC VALVE: Leaflets exhibited normal thickness, no calcification, and normal cuspal separation  DOPPLER: The transpulmonic velocity was within the normal range  There was no significant regurgitation  PERICARDIUM: There was no pericardial effusion  The pericardium was normal in appearance  AORTA: The root exhibited normal size  SYSTEMIC VEINS: IVC: The inferior vena cava was normal in size      SYSTEM MEASUREMENT TABLES    2D mode  AoR Diam 2D: 3 6 cm  LA Diam (2D): 4 4 cm  LA/Ao (2D): 1 22  FS (2D Teich): 25 9 %  IVSd (2D): 1 2 cm  LVDEV: 113 cm³  LVESV: 55 5 cm³  LVIDd(2D): 4 9 cm  LVISd (2D): 3 63 cm  LVOT Area 2D: 3 14 cm squared  LVPWd (2D): 1 24 cm  SV (Teich): 57 5 cm³    Unspecified Scan Mode  DHARA Cont Eq (Peak Jason): 2 36 cm squared  LVOT Diam : 2 cm  LVOT Vmax: 775 mm/s  LVOT Vmax; Mean: 775 mm/s  Peak Grad ; Mean: 2 mm[Hg]  MV Peak E Jason  Mean: 780 mm/s  MVA (PHT): 4 cm squared  PHT: 55 ms  RA Area: 22 3 cm squared  RA Volume: 62 7 cm³  TAPSE: 1 6 cm    [facility]redited [facility]    Prepared and electronically signed by    Lisseth Mariano MD  Signed 2019 11:16:34         Results for orders placed during the hospital encounter of 19   NM myocardial perfusion spect (stress and/or rest)    Narrative [address]  [address] [facility] [phone]    Rest/Stress Gated SPECT Myocardial Perfusion Imaging After Regadenoson    Patient: Daniel Weller  MR number: [id number]  Account number: [de-identified]  : 1970  Age: 46 years  Gender: Male  Status: Outpatient  Location: Stress lab  Height: 72 in  Weight: 210 lb  BP: 156/ 86 mmHg    Allergies: NO KNOWN ALLERGIES    Diagnosis: I50 9 - Heart failure, unspecified    Primary Physician:  Wendy Yuan DO  Technician:  Alf Jacobson  RN:  ELIER Daly  Referring Physician:  Shane Damon MD  Group:  [facility]  Report Prepared By[de-identified]  ELIER Daly  Interpreting Physician:  Leatha Jiang MD    INDICATIONS: Evaluation for coronary artery disease  HISTORY: The patient is a 46year old  male  Chest pain status: no chest pain  Other symptoms: dyspnea  Coronary artery disease risk factors: dyslipidemia and hypertension  Cardiovascular history: arrhythmia  Co-morbidity: history  of lung disease   Medications:an anticoagulant, a calcium channel blocker, a diuretic, clopidogrel, and an antihypertensive agent  PHYSICAL EXAM: Baseline physical exam screening: normal and no wheezes audible  REST ECG: Atrial fibrillation  PROCEDURE: The study was performed in the the Stress lab  A regadenoson infusion pharmacologic stress test was performed  Gated SPECT myocardial perfusion imaging was performed after stress and at rest  Systolic blood pressure was 156  mmHg, at the start of the study  Diastolic blood pressure was 86 mmHg, at the start of the study  The heart rate was 117 bpm, at the start of the study  IV double checked  Regadenoson protocol:  Time HR bpm SBP mmHg DBP mmHg Symptoms Rhythm/conduct  Baseline 08:55 117 156 86 none A-fib, rare PVC's  Immediate 09:05 134 174 86 mild dyspnea same as above  1 min 09:50 126 148 84 same as above --  2 min 09:51 123 150 84 same as above --  3 min 09:50 110 152 84 subsiding --  No medications or fluids given  The patient also performed low level exercise  STRESS SUMMARY: Duration of pharmacologic stress was 3 min  Maximal heart rate during stress was 150 bpm  The heart rate response to stress was normal  There was resting hypertension with an appropriate blood pressure response to stress  The rate-pressure product for the peak heart rate and blood pressure was 23480  There was no chest pain during stress  The stress test was terminated due to protocol completion  Pre oxygen saturation: 96 %  Peak oxygen saturation: 98 %  The stress ECG was equivocal for ischemia  Arrhythmia during stress: isolated premature ventricular beats  ISOTOPE ADMINISTRATION:  Resting isotope administration Stress isotope administration  Agent Tetrofosmin Tetrofosmin  Date 01/21/2019 01/21/2019    The radiopharmaceutical was injected at the peak effect of pharmacologic stress  MYOCARDIAL PERFUSION IMAGING:  Left ventricular size was normal  The TID ratio was   86      PERFUSION DEFECTS:  -  There was a moderate to large, severe, partially reversible myocardial perfusion defect of the entire inferolateral and inferoapical wall  GATED SPECT:  The calculated left ventricular ejection fraction was 43 %  Left ventricular ejection fraction was mildly decreased by visual estimate  There was moderately reduced myocardial thickening and motion of the lateral wall and inferior wall of  the left ventricle  SUMMARY:  -  Stress results: There was resting hypertension with an appropriate blood pressure response to stress  There was no chest pain during stress  -  ECG conclusions: The stress ECG was equivocal for ischemia  -  Perfusion imaging: There was a moderate to large, severe, partially reversible myocardial perfusion defect of the entire inferolateral and inferoapical wall  -  Gated SPECT: The calculated left ventricular ejection fraction was 43 %  Left ventricular ejection fraction was mildly decreased by visual estimate  There was moderately reduced myocardial thickening and motion of the lateral wall and  inferior wall of the left ventricle  -  Impressions and recommendations: Left ventricular systolic function was reduced, with regional wall motion abnormalities  Ef 43%  Pt was in atrial Fib during study  IMPRESSIONS: Abnormal study after pharmacologic vasodilation  There was a moderate-sized infarct and a moderate amount of ischemia in the distribution of right coronary artery or the left circumflex coronary artery  Left ventricular  systolic function was reduced, with regional wall motion abnormalities  Ef 43%  Pt was in atrial Fib during study  Prepared and signed by    Te Rizzo MD  Signed 2019 12:01:54       Mid LAD: There was a diffuse 30 % stenosis  Mid circumflex: There was a discrete 25 % stenosis  RCA: There was a tubular 25 % stenosis in the middle third of the vessel segment        Flash Jackman  Please call with any questions or suggestions    A description of the counselin min abnormal stress test, consent, catheterization  Goals and Barriers:  Patient's ability to self care:  Medication side effect reviewed with patient in detail and all their questions answered  "This note has been constructed using a voice recognition system  Therefore there may be syntax, spelling, and/or grammatical errors   Please call if you have any questions  "

## 2021-01-03 DIAGNOSIS — R60.0 BILATERAL LEG EDEMA: ICD-10-CM

## 2021-02-15 DIAGNOSIS — R60.0 BILATERAL LEG EDEMA: ICD-10-CM

## 2021-02-16 DIAGNOSIS — R60.0 BILATERAL LEG EDEMA: ICD-10-CM

## 2021-02-16 RX ORDER — FUROSEMIDE 20 MG/1
TABLET ORAL
Qty: 90 TABLET | Refills: 3 | Status: SHIPPED | OUTPATIENT
Start: 2021-02-16

## 2021-02-16 RX ORDER — FUROSEMIDE 20 MG/1
20 TABLET ORAL DAILY
Qty: 90 TABLET | Refills: 3 | Status: SHIPPED | OUTPATIENT
Start: 2021-02-16

## 2021-03-10 ENCOUNTER — TELEPHONE (OUTPATIENT)
Dept: CARDIOLOGY CLINIC | Facility: CLINIC | Age: 51
End: 2021-03-10

## 2021-03-15 ENCOUNTER — OFFICE VISIT (OUTPATIENT)
Dept: CARDIOLOGY CLINIC | Facility: CLINIC | Age: 51
End: 2021-03-15
Payer: COMMERCIAL

## 2021-03-15 VITALS
DIASTOLIC BLOOD PRESSURE: 88 MMHG | WEIGHT: 222 LBS | BODY MASS INDEX: 29.42 KG/M2 | OXYGEN SATURATION: 98 % | HEART RATE: 78 BPM | HEIGHT: 73 IN | TEMPERATURE: 97.7 F | SYSTOLIC BLOOD PRESSURE: 144 MMHG

## 2021-03-15 DIAGNOSIS — I48.19 PERSISTENT ATRIAL FIBRILLATION (HCC): Primary | ICD-10-CM

## 2021-03-15 DIAGNOSIS — E78.2 MIXED HYPERLIPIDEMIA: ICD-10-CM

## 2021-03-15 DIAGNOSIS — R94.39 ABNORMAL STRESS TEST: ICD-10-CM

## 2021-03-15 DIAGNOSIS — R06.00 EXERTIONAL DYSPNEA: ICD-10-CM

## 2021-03-15 DIAGNOSIS — R60.0 BILATERAL LEG EDEMA: ICD-10-CM

## 2021-03-15 DIAGNOSIS — I25.10 CAD IN NATIVE ARTERY: ICD-10-CM

## 2021-03-15 PROCEDURE — 93000 ELECTROCARDIOGRAM COMPLETE: CPT | Performed by: INTERNAL MEDICINE

## 2021-03-15 PROCEDURE — 99214 OFFICE O/P EST MOD 30 MIN: CPT | Performed by: INTERNAL MEDICINE

## 2021-03-15 RX ORDER — ROSUVASTATIN CALCIUM 20 MG/1
20 TABLET, COATED ORAL
Qty: 90 TABLET | Refills: 3 | Status: SHIPPED | OUTPATIENT
Start: 2021-03-15 | End: 2021-10-29 | Stop reason: SDUPTHER

## 2021-03-15 RX ORDER — METOPROLOL SUCCINATE 25 MG/1
25 TABLET, EXTENDED RELEASE ORAL
Qty: 90 TABLET | Refills: 3 | Status: SHIPPED | OUTPATIENT
Start: 2021-03-15 | End: 2021-10-18 | Stop reason: SDUPTHER

## 2021-03-15 RX ORDER — FLECAINIDE ACETATE 50 MG/1
50 TABLET ORAL 2 TIMES DAILY
Qty: 180 TABLET | Refills: 3 | Status: SHIPPED | OUTPATIENT
Start: 2021-03-15

## 2021-03-15 RX ORDER — ASPIRIN 81 MG/1
162 TABLET, CHEWABLE ORAL DAILY
Qty: 180 TABLET | Refills: 3 | Status: SHIPPED | OUTPATIENT
Start: 2021-03-15 | End: 2021-10-29 | Stop reason: SDUPTHER

## 2021-03-15 RX ORDER — CELECOXIB 200 MG/1
CAPSULE ORAL
COMMUNITY
Start: 2020-12-23

## 2021-03-15 NOTE — PROGRESS NOTES
Cardiology Follow-up  Lennox Butts  1970  [id number]  [address] [facility] [address] [address] [phone]      1  Persistent atrial fibrillation (HCC)  POCT ECG    aspirin (RA Aspirin Adult Low Strength) 81 mg chewable tablet    flecainide (TAMBOCOR) 50 mg tablet   2  CAD in native artery  POCT ECG    CBC    Comprehensive metabolic panel    Lipid Panel with Direct LDL reflex    rosuvastatin (CRESTOR) 20 MG tablet   3  Exertional dyspnea  POCT ECG    CBC    Comprehensive metabolic panel    Lipid Panel with Direct LDL reflex    metoprolol succinate (TOPROL-XL) 25 mg 24 hr tablet   4  Mixed hyperlipidemia  POCT ECG    CBC    Comprehensive metabolic panel    Lipid Panel with Direct LDL reflex   5  Bilateral leg edema  POCT ECG    CBC    Comprehensive metabolic panel    Lipid Panel with Direct LDL reflex   6  Abnormal stress test  metoprolol succinate (TOPROL-XL) 25 mg 24 hr tablet      Discussion/Plan:  Atrial fibrillation-  Aspirin 162mg daily  Successful cardioversion continue metoprolol 25 mg daily plus flecainide 50 mg twice a day  [id number]  Exertional dyspnea/abnormal stress test- cardiac catheterization negative for obstructive CAD  Abnormal pulmonary function test likely with component of COPD  Component may be secondary to the patient's atrial fibrillation  Given he has a normal EF without obstructive CAD we will start flecainide 50 mg twice a day plus continue metoprolol  Chronic diastolic heart failure-   Continue furosemide 20mg every  day metoprolol 25 mg daily  Edema has been controlled    CAD- aspirin + rosuvastatin    Asthma    Alcohol abuse- cessation    Hld- continue atorvastatin    History:  57-year-old gentleman with no prior cardiac history presents with increased exertional shortness of breath found to be in atrial fibrillation    He also has had worsening lower extremity edema, PND and orthopnea  A nuclear stress test was completed which shows a inferior defect with moderate dc-infarct ischemia  He had an echo 2D which shows normal LV systolic function  He has been on a diuretic with improvement in his lower extremity edema but shortness of breath still remains  He is currently in rate controlled atrial fibrillation  He denies having any prior history of myocardial infarction or stroke  He is compliant with Eliquis without any significant bleeding  He currently denies having active chest pain  03/08/2019:  He has felt much better since his cardioversion  He feels like he has much better exercise stamina  He denies having lower extremity edema  Denies having PND orthopnea  10/17/2019: His weight has been controlled  His atrial fibrillation has been well suppressed  He denies feeling chest discomfort or shortness of breath  We reviewed through his lipids  His triglycerides are still mildly elevated  He will trying cut out more of the carbohydrate in his diet  09/18/2020:  He denies having any significant palpitations  He states lower extremity swelling has been well controlled  He is compliant with medications  03/15/2021:  He reports having decreased activity since his surgery  He is compliant with medications  Denies feeling dizziness or lightheadedness  He is taking his furosemide every day  He reported having increased lower extremity swelling      Patient Active Problem List   Diagnosis    SOBOE (shortness of breath on exertion)    Atrial fibrillation (HCC)     Past Medical History:   Diagnosis Date    Asthma     [facility]     Hyperlipidemia     Hypertension      Social History     Socioeconomic History    Marital status: Significant Other     Spouse name: Not on file    Number of children: Not on file    Years of education: Not on file    Highest education level: Not on file   Occupational History    Not on file   Social Needs    Financial resource strain: Not on file    Food insecurity     Worry: Not on file     Inability: Not on file    Transportation needs     Medical: Not on file     Non-medical: Not on file   Tobacco Use    Smoking status: Never Smoker    Smokeless tobacco: Current User     Types: Chew    Tobacco comment: uses chewing tobacco   Substance and Sexual Activity    Alcohol use: Yes     Alcohol/week: 3 0 standard drinks     Types: 3 Standard drinks or equivalent per week     Frequency: 4 or more times a week     Drinks per session: 3 or 4    Drug use: No     Comment: Quit cocaine in 2012  - As per Wanda Springer Sexual activity: Not on file   Lifestyle    Physical activity     Days per week: Not on file     Minutes per session: Not on file    Stress: Not on file   Relationships    Social connections     Talks on phone: Not on file     Gets together: Not on file     Attends Confucianist service: Not on file     Active member of club or organization: Not on file     Attends meetings of clubs or organizations: Not on file     Relationship status: Not on file    Intimate partner violence     Fear of current or ex partner: Not on file     Emotionally abused: Not on file     Physically abused: Not on file     Forced sexual activity: Not on file   Other Topics Concern    Not on file   Social History Narrative    Not on file      Family History   Problem Relation Age of Onset    No Known Problems Mother     Prostate cancer Father      History reviewed  No pertinent surgical history      Current Outpatient Medications:     aspirin (RA Aspirin Adult Low Strength) 81 mg chewable tablet, Chew 2 tablets (162 mg total) daily Chew and swallow, Disp: 180 tablet, Rfl: 3    celecoxib (CeleBREX) 200 mg capsule, 1 tablet twice daily for 10 days, then decreased to once per day, Disp: , Rfl:     flecainide (TAMBOCOR) 50 mg tablet, Take 1 tablet (50 mg total) by mouth 2 (two) times a day, Disp: 180 tablet, Rfl: 3    flecainide (TAMBOCOR) 50 mg tablet, Take 1 tablet (50 mg total) by mouth 2 (two) times a day, Disp: 180 tablet, Rfl: 3    furosemide (LASIX) 20 mg tablet, Take 1 tablet (20 mg total) by mouth daily, Disp: 90 tablet, Rfl: 3    furosemide (LASIX) 20 mg tablet, take 1 tablet by mouth once daily, Disp: 90 tablet, Rfl: 3    lansoprazole (PREVACID) 30 mg capsule, Take 30 mg by mouth daily  , Disp: , Rfl:     melatonin 1 mg, Take 3 mg by mouth daily at bedtime , Disp: , Rfl:     metoprolol succinate (TOPROL-XL) 25 mg 24 hr tablet, Take 1 tablet (25 mg total) by mouth daily in the early morning, Disp: 90 tablet, Rfl: 3    rosuvastatin (CRESTOR) 20 MG tablet, Take 1 tablet (20 mg total) by mouth daily at bedtime, Disp: 90 tablet, Rfl: 3  No Known Allergies    Review of Systems:  Review of Systems   Constitutional: Negative  HENT: Negative  Eyes: Negative  Cardiovascular: Negative for chest pain and leg swelling  Gastrointestinal: Negative  Endocrine: Negative  Genitourinary: Negative  Musculoskeletal: Negative  Skin: Negative  Allergic/Immunologic: Negative  Neurological: Negative  Hematological: Negative  Psychiatric/Behavioral: Negative  Vitals:    03/15/21 1314   BP: 144/88   BP Location: Right arm   Patient Position: Sitting   Cuff Size: Standard   Pulse: 78   Temp: 97 7 °F (36 5 °C)   SpO2: 98%   Weight: 101 kg (222 lb)   Height: 6' 1" (1 854 m)     Physical Exam:  Physical Exam   Constitutional: He is oriented to person, place, and time  He appears well-developed and well-nourished  No distress  HENT:   Head: Normocephalic and atraumatic  Right Ear: External ear normal    Left Ear: External ear normal    Eyes: Pupils are equal, round, and reactive to light  Conjunctivae are normal  Right eye exhibits no discharge  Left eye exhibits no discharge  No scleral icterus  Neck: Normal range of motion  Neck supple  No JVD present  No tracheal deviation present   No thyromegaly present  Cardiovascular: Normal rate and regular rhythm  Exam reveals gallop  Exam reveals no friction rub  No murmur heard  Pulmonary/Chest: Effort normal and breath sounds normal  No stridor  No respiratory distress  He has no wheezes  He has no rales  He exhibits no tenderness  Abdominal: Soft  Bowel sounds are normal  He exhibits distension  He exhibits no mass  There is no abdominal tenderness  There is no rebound and no guarding  Musculoskeletal: Normal range of motion  General: No tenderness, deformity or edema  Neurological: He is alert and oriented to person, place, and time  He has normal reflexes  No cranial nerve deficit  He exhibits normal muscle tone  Coordination normal    Skin: Skin is warm and dry  No rash noted  He is not diaphoretic  No erythema  No pallor  Psychiatric: He has a normal mood and affect  His behavior is normal  Judgment and thought content normal    Nursing note and vitals reviewed  Labs:     Lab Results   Component Value Date    WBC 6 20 09/28/2016    HGB 14 8 09/28/2016    HCT 43 0 09/28/2016    MCV 92 09/28/2016     09/28/2016     Lab Results   Component Value Date    K 4 8 10/03/2019    CL 97 10/03/2019    CO2 24 10/03/2019    BUN 15 10/03/2019    CREATININE 0 97 10/03/2019    CALCIUM 8 7 09/28/2016    AST 25 10/03/2019    ALT 38 10/03/2019    ALKPHOS 82 09/28/2016    EGFR >60 0 09/28/2016     No results found for: CHOL  Lab Results   Component Value Date    HDL 49 10/03/2019     Lab Results   Component Value Date    LDLCALC 110 (H) 10/03/2019     Lab Results   Component Value Date    TRIG 168 (H) 10/03/2019     No results found for: HGBA1C    Imaging & Testing   I have personally reviewed pertinent reports  EKG: Personally reviewed      Normal sinus rhythm inferior-t wave inversions    Cardiac testing:   Results for orders placed during the hospital encounter of 01/16/19   Echo complete with contrast if indicated    [address]  [address] [facility]  [address] [facility] [address]  [phone]    Transthoracic Echocardiogram  2D, M-mode, Doppler, and Color Doppler    Study date:  2019    Patient: Alona Ferreira  MR number: [id number]  Account number: [de-identified]  : 1970  Age: 46 years  Gender: Male  Status: Outpatient  Location: Echo lab  Height: 72 in  Weight: 209 7 lb  BP: 129/ 82 mmHg    Indications: atrial fibrillation    Diagnoses: I48 0 - Atrial fibrillation    Sonographer:  CARROLL Jennings  Primary Physician:  Geoff Ronquillo DO  Referring Physician:  Gavin Goldsmith MD  Group:  [facility]  Interpreting Physician:  Camilla Ahuja MD    SUMMARY    LEFT VENTRICLE:  Definity given for improved LV definition  Systolic function was at the lower limits of normal  Ejection fraction was estimated in the range of 50 % to 55 % to be 55 %  There were no regional wall motion abnormalities  Wall thickness was mildly increased  LEFT ATRIUM:  The atrium was moderately dilated based on linear measurement of 4 4cm and index area of 35 ml/m2    RIGHT ATRIUM:  The atrium was mildly dilated based on right atrial area measurement of 22 cm2    MITRAL VALVE:  There was trace regurgitation  TRICUSPID VALVE:  The tricuspid jet envelope definition was inadequate for estimation of RV systolic pressure  There are no indirect findings (abnormal RV volume or geometry, altered pulmonary flow velocity profile, or leftward septal displacement) which  would suggest moderate or severe pulmonary hypertension  HISTORY: PRIOR HISTORY: HTN,Hyperlipidemia,Asthma  PROCEDURE: The procedure was performed in the echo lab  This was a routine study  The transthoracic approach was used  The study included complete 2D imaging, M-mode, complete spectral Doppler, and color Doppler  The heart rate was 105  bpm, at the start of the study   Images were obtained from the parasternal, apical, subcostal, and suprasternal notch acoustic windows  Intravenous contrast (Definity solution [1 3 ml Definity/8 7ml normal saline solution], 5 ml) was  administered to evaluate myocardial perfusion and opacify the left ventricle  Echocardiographic views were limited due to poor acoustic window availability, decreased penetration, and lung interference  This was a technically difficult  study  LEFT VENTRICLE: Definity given for improved LV definition Size was normal  Systolic function was at the lower limits of normal  Ejection fraction was estimated in the range of 50 % to 55 % to be 55 %  There were no regional wall motion  abnormalities  Wall thickness was mildly increased  No evidence of apical thrombus  DOPPLER: The study was not technically sufficient to allow evaluation of LV diastolic function  RIGHT VENTRICLE: The size was normal  Systolic function was normal  Wall thickness was normal     LEFT ATRIUM: The atrium was moderately dilated based on linear measurement of 4 4cm and index area of 35 ml/m2    RIGHT ATRIUM: The atrium was mildly dilated based on right atrial area measurement of 22 cm2    MITRAL VALVE: There was mild thickening  There was normal leaflet separation  DOPPLER: The transmitral velocity was within the normal range  There was no evidence for stenosis  There was trace regurgitation  AORTIC VALVE: The valve was probably trileaflet  Leaflets exhibited mildly increased thickness and normal cuspal separation  DOPPLER: Transaortic velocity was within the normal range  There was no evidence for stenosis  There was no  significant regurgitation  TRICUSPID VALVE: The valve structure was normal  There was normal leaflet separation  DOPPLER: The transtricuspid velocity was within the normal range  There was no evidence for stenosis  There was no significant regurgitation  The  tricuspid jet envelope definition was inadequate for estimation of RV systolic pressure   There are no indirect findings (abnormal RV volume or geometry, altered pulmonary flow velocity profile, or leftward septal displacement) which would  suggest moderate or severe pulmonary hypertension  PULMONIC VALVE: Leaflets exhibited normal thickness, no calcification, and normal cuspal separation  DOPPLER: The transpulmonic velocity was within the normal range  There was no significant regurgitation  PERICARDIUM: There was no pericardial effusion  The pericardium was normal in appearance  AORTA: The root exhibited normal size  SYSTEMIC VEINS: IVC: The inferior vena cava was normal in size  SYSTEM MEASUREMENT TABLES    2D mode  AoR Diam 2D: 3 6 cm  LA Diam (2D): 4 4 cm  LA/Ao (2D): 1 22  FS (2D Teich): 25 9 %  IVSd (2D): 1 2 cm  LVDEV: 113 cm³  LVESV: 55 5 cm³  LVIDd(2D): 4 9 cm  LVISd (2D): 3 63 cm  LVOT Area 2D: 3 14 cm squared  LVPWd (2D): 1 24 cm  SV (Teich): 57 5 cm³    Unspecified Scan Mode  DHARA Cont Eq (Peak Jason): 2 36 cm squared  LVOT Diam : 2 cm  LVOT Vmax: 775 mm/s  LVOT Vmax; Mean: 775 mm/s  Peak Grad ; Mean: 2 mm[Hg]  MV Peak E Jason   Mean: 780 mm/s  MVA (PHT): 4 cm squared  PHT: 55 ms  RA Area: 22 3 cm squared  RA Volume: 62 7 cm³  TAPSE: 1 6 cm    [facility]    Prepared and electronically signed by    Kateri Skiff, MD  Signed 2019 11:16:34         Results for orders placed during the hospital encounter of 19   NM myocardial perfusion spect (stress and/or rest)    [address]  [address] [facility]  [phone]    Rest/Stress Gated SPECT Myocardial Perfusion Imaging After Regadenoson    Patient: Mo Dumont  MR number: [id number]  Account number: [de-identified]  : 1970  Age: 46 years  Gender: Male  Status: Outpatient  Location: Stress lab  Height: 72 in  Weight: 210 lb  BP: 156/ 86 mmHg    Allergies: NO KNOWN ALLERGIES    Diagnosis: I50 9 - Heart failure, unspecified    Primary Physician:  Chana Poe Hope Gunderson DO  Technician:  Jesus Manuel Kim  RN:  ELIER Elizondo  Referring Physician:  Andrea Bryant MD  Group:  [facility]  Report Prepared By[de-identified]  ELIER Elizondo  Interpreting Physician:  Guido King MD    INDICATIONS: Evaluation for coronary artery disease  HISTORY: The patient is a 46year old  male  Chest pain status: no chest pain  Other symptoms: dyspnea  Coronary artery disease risk factors: dyslipidemia and hypertension  Cardiovascular history: arrhythmia  Co-morbidity: history  of lung disease  Medications:an anticoagulant, a calcium channel blocker, a diuretic, clopidogrel, and an antihypertensive agent  PHYSICAL EXAM: Baseline physical exam screening: normal and no wheezes audible  REST ECG: Atrial fibrillation  PROCEDURE: The study was performed in the the Stress lab  A regadenoson infusion pharmacologic stress test was performed  Gated SPECT myocardial perfusion imaging was performed after stress and at rest  Systolic blood pressure was 156  mmHg, at the start of the study  Diastolic blood pressure was 86 mmHg, at the start of the study  The heart rate was 117 bpm, at the start of the study  IV double checked  Regadenoson protocol:  Time HR bpm SBP mmHg DBP mmHg Symptoms Rhythm/conduct  Baseline 08:55 117 156 86 none A-fib, rare PVC's  Immediate 09:05 134 174 86 mild dyspnea same as above  1 min 09:50 126 148 84 same as above --  2 min 09:51 123 150 84 same as above --  3 min 09:50 110 152 84 subsiding --  No medications or fluids given  The patient also performed low level exercise  STRESS SUMMARY: Duration of pharmacologic stress was 3 min  Maximal heart rate during stress was 150 bpm  The heart rate response to stress was normal  There was resting hypertension with an appropriate blood pressure response to stress  The rate-pressure product for the peak heart rate and blood pressure was 00515  There was no chest pain during stress   The stress test was terminated due to protocol completion  Pre oxygen saturation: 96 %  Peak oxygen saturation: 98 %  The stress ECG was equivocal for ischemia  Arrhythmia during stress: isolated premature ventricular beats  ISOTOPE ADMINISTRATION:  Resting isotope administration Stress isotope administration  Agent Tetrofosmin Tetrofosmin  Date 01/21/2019 01/21/2019    The radiopharmaceutical was injected at the peak effect of pharmacologic stress  MYOCARDIAL PERFUSION IMAGING:  Left ventricular size was normal  The TID ratio was   86  PERFUSION DEFECTS:  -  There was a moderate to large, severe, partially reversible myocardial perfusion defect of the entire inferolateral and inferoapical wall  GATED SPECT:  The calculated left ventricular ejection fraction was 43 %  Left ventricular ejection fraction was mildly decreased by visual estimate  There was moderately reduced myocardial thickening and motion of the lateral wall and inferior wall of  the left ventricle  SUMMARY:  -  Stress results: There was resting hypertension with an appropriate blood pressure response to stress  There was no chest pain during stress  -  ECG conclusions: The stress ECG was equivocal for ischemia  -  Perfusion imaging: There was a moderate to large, severe, partially reversible myocardial perfusion defect of the entire inferolateral and inferoapical wall  -  Gated SPECT: The calculated left ventricular ejection fraction was 43 %  Left ventricular ejection fraction was mildly decreased by visual estimate  There was moderately reduced myocardial thickening and motion of the lateral wall and  inferior wall of the left ventricle  -  Impressions and recommendations: Left ventricular systolic function was reduced, with regional wall motion abnormalities  Ef 43%  Pt was in atrial Fib during study  IMPRESSIONS: Abnormal study after pharmacologic vasodilation   There was a moderate-sized infarct and a moderate amount of ischemia in the distribution of right coronary artery or the left circumflex coronary artery  Left ventricular  systolic function was reduced, with regional wall motion abnormalities  Ef 43%  Pt was in atrial Fib during study  Prepared and signed by    Ehsan Mueller MD  Signed 2019 12:01:54       Mid LAD: There was a diffuse 30 % stenosis  Mid circumflex: There was a discrete 25 % stenosis  RCA: There was a tubular 25 % stenosis in the middle third of the vessel segment  Yudy Jackman  Please call with any questions or suggestions    A description of the counselin min abnormal stress test, consent, catheterization  Goals and Barriers:  Patient's ability to self care:  Medication side effect reviewed with patient in detail and all their questions answered  "This note has been constructed using a voice recognition system  Therefore there may be syntax, spelling, and/or grammatical errors   Please call if you have any questions  "

## 2021-03-16 RX ORDER — FUROSEMIDE 20 MG/1
TABLET ORAL
Qty: 90 TABLET | Refills: 3 | Status: SHIPPED | OUTPATIENT
Start: 2021-03-16 | End: 2022-06-09

## 2021-10-18 DIAGNOSIS — R94.39 ABNORMAL STRESS TEST: ICD-10-CM

## 2021-10-18 DIAGNOSIS — R06.00 EXERTIONAL DYSPNEA: ICD-10-CM

## 2021-10-18 RX ORDER — METOPROLOL SUCCINATE 25 MG/1
25 TABLET, EXTENDED RELEASE ORAL
Qty: 90 TABLET | Refills: 3 | Status: SHIPPED | OUTPATIENT
Start: 2021-10-18 | End: 2021-10-29 | Stop reason: SDUPTHER

## 2021-10-29 ENCOUNTER — OFFICE VISIT (OUTPATIENT)
Dept: CARDIOLOGY CLINIC | Facility: CLINIC | Age: 51
End: 2021-10-29
Payer: COMMERCIAL

## 2021-10-29 VITALS
WEIGHT: 209 LBS | HEART RATE: 99 BPM | TEMPERATURE: 97.3 F | OXYGEN SATURATION: 99 % | HEIGHT: 73 IN | DIASTOLIC BLOOD PRESSURE: 84 MMHG | BODY MASS INDEX: 27.7 KG/M2 | SYSTOLIC BLOOD PRESSURE: 132 MMHG

## 2021-10-29 DIAGNOSIS — I50.33 ACUTE ON CHRONIC DIASTOLIC CHF (CONGESTIVE HEART FAILURE) (HCC): ICD-10-CM

## 2021-10-29 DIAGNOSIS — R94.39 ABNORMAL STRESS TEST: ICD-10-CM

## 2021-10-29 DIAGNOSIS — R06.00 EXERTIONAL DYSPNEA: ICD-10-CM

## 2021-10-29 DIAGNOSIS — E78.2 MIXED HYPERLIPIDEMIA: ICD-10-CM

## 2021-10-29 DIAGNOSIS — I25.10 CAD IN NATIVE ARTERY: ICD-10-CM

## 2021-10-29 DIAGNOSIS — I48.19 PERSISTENT ATRIAL FIBRILLATION (HCC): Primary | ICD-10-CM

## 2021-10-29 PROCEDURE — 99214 OFFICE O/P EST MOD 30 MIN: CPT | Performed by: INTERNAL MEDICINE

## 2021-10-29 PROCEDURE — 93000 ELECTROCARDIOGRAM COMPLETE: CPT | Performed by: INTERNAL MEDICINE

## 2021-10-29 RX ORDER — FLECAINIDE ACETATE 50 MG/1
50 TABLET ORAL 2 TIMES DAILY
Qty: 180 TABLET | Refills: 3 | Status: SHIPPED | OUTPATIENT
Start: 2021-10-29

## 2021-10-29 RX ORDER — ASPIRIN 81 MG/1
162 TABLET, CHEWABLE ORAL DAILY
Qty: 180 TABLET | Refills: 3 | Status: SHIPPED | OUTPATIENT
Start: 2021-10-29

## 2021-10-29 RX ORDER — METOPROLOL SUCCINATE 25 MG/1
25 TABLET, EXTENDED RELEASE ORAL
Qty: 90 TABLET | Refills: 3 | Status: SHIPPED | OUTPATIENT
Start: 2021-10-29

## 2021-10-29 RX ORDER — ROSUVASTATIN CALCIUM 20 MG/1
20 TABLET, COATED ORAL
Qty: 90 TABLET | Refills: 3 | Status: SHIPPED | OUTPATIENT
Start: 2021-10-29

## 2021-11-11 ENCOUNTER — TELEPHONE (OUTPATIENT)
Dept: CARDIOLOGY CLINIC | Facility: CLINIC | Age: 51
End: 2021-11-11

## 2022-01-19 ENCOUNTER — TELEPHONE (OUTPATIENT)
Dept: CARDIOLOGY CLINIC | Facility: CLINIC | Age: 52
End: 2022-01-19

## 2022-01-19 NOTE — TELEPHONE ENCOUNTER
Patient called after post op visit - swelling in legs  Was advised to call us and see about increasing lasix dose  Taking 20mg daily  Reports slight SOB  No weight gain

## 2022-01-20 NOTE — TELEPHONE ENCOUNTER
Let have him increase his furosemide to 40mg in morning and recheck his bmp in two weeks  Return to 20mg daily when leg swelling resolved   If continued weight gain, he needs to let us know

## 2022-04-19 ENCOUNTER — OFFICE VISIT (OUTPATIENT)
Dept: CARDIOLOGY CLINIC | Facility: CLINIC | Age: 52
End: 2022-04-19
Payer: COMMERCIAL

## 2022-04-19 VITALS
HEART RATE: 103 BPM | WEIGHT: 208 LBS | HEIGHT: 73 IN | BODY MASS INDEX: 27.57 KG/M2 | OXYGEN SATURATION: 98 % | DIASTOLIC BLOOD PRESSURE: 110 MMHG | TEMPERATURE: 97.8 F | SYSTOLIC BLOOD PRESSURE: 160 MMHG

## 2022-04-19 DIAGNOSIS — F10.10 ALCOHOL ABUSE: ICD-10-CM

## 2022-04-19 DIAGNOSIS — I48.91 ATRIAL FIBRILLATION, UNSPECIFIED TYPE (HCC): Primary | ICD-10-CM

## 2022-04-19 DIAGNOSIS — I50.33 ACUTE ON CHRONIC DIASTOLIC CHF (CONGESTIVE HEART FAILURE) (HCC): ICD-10-CM

## 2022-04-19 DIAGNOSIS — R06.02 SOBOE (SHORTNESS OF BREATH ON EXERTION): ICD-10-CM

## 2022-04-19 PROCEDURE — 99214 OFFICE O/P EST MOD 30 MIN: CPT | Performed by: INTERNAL MEDICINE

## 2022-04-19 PROCEDURE — 93000 ELECTROCARDIOGRAM COMPLETE: CPT | Performed by: INTERNAL MEDICINE

## 2022-04-19 RX ORDER — TADALAFIL 20 MG/1
20 TABLET ORAL DAILY
COMMUNITY
Start: 2022-03-12

## 2022-04-19 NOTE — PROGRESS NOTES
Cardiology Follow-up  Crow Long  1970  [id number]  [address] [facility] [address] [id number] [phone]      1  Atrial fibrillation, unspecified type ([address] )  POCT ECG    Echo complete w/ contrast if indicated    Lipid Panel with Direct LDL reflex    Comprehensive metabolic panel    CBC    AMB extended holter monitor   2  Acute on chronic diastolic CHF (congestive heart failure) (HCC)  POCT ECG    Lipid Panel with Direct LDL reflex    Comprehensive metabolic panel    CBC   3  SOBOE (shortness of breath on exertion)  POCT ECG    Echo complete w/ contrast if indicated    Lipid Panel with Direct LDL reflex    Comprehensive metabolic panel    CBC    AMB extended holter monitor   4  Alcohol abuse  Echo complete w/ contrast if indicated    Lipid Panel with Direct LDL reflex    Comprehensive metabolic panel    CBC      Discussion/Plan:  Atrial fibrillation-  Aspirin 162mg daily  Successful cardioversion continue metoprolol 25 mg daily plus flecainide 50 mg twice a day  [id number]  Echo 2D to re-evaluate his EF  I discussed the association of alcohol with AFib and cardiomyopathy  We will also have wear a 3 day extended Holter monitor  Chronic diastolic heart failure-   Continue furosemide 20mg every  day metoprolol 25 mg daily  Edema has been controlled  Recheck blood work  A possible addition of SLGT 2    GERD- recommend follow-up with GI    CAD- aspirin + rosuvastatin    Asthma    Alcohol abuse- cessation discussed    Hld- continue atorvastatin    History:  49-year-old gentleman with no prior cardiac history presents with increased exertional shortness of breath found to be in atrial fibrillation  He also has had worsening lower extremity edema, PND and orthopnea  A nuclear stress test was completed which shows a inferior defect with moderate dc-infarct ischemia    He had an echo 2D which shows normal LV systolic function  He has been on a diuretic with improvement in his lower extremity edema but shortness of breath still remains  He is currently in rate controlled atrial fibrillation  He denies having any prior history of myocardial infarction or stroke  He is compliant with Eliquis without any significant bleeding  He currently denies having active chest pain  03/08/2019:  He has felt much better since his cardioversion  He feels like he has much better exercise stamina  He denies having lower extremity edema  Denies having PND orthopnea  10/17/2019: His weight has been controlled  His atrial fibrillation has been well suppressed  He denies feeling chest discomfort or shortness of breath  We reviewed through his lipids  His triglycerides are still mildly elevated  He will trying cut out more of the carbohydrate in his diet  09/18/2020:  He denies having any significant palpitations  He states lower extremity swelling has been well controlled  He is compliant with medications  03/15/2021:  He reports having decreased activity since his surgery  He is compliant with medications  Denies feeling dizziness or lightheadedness  He is taking his furosemide every day  He reported having increased lower extremity swelling  10/29/2021:  He denies having any recurrence of significant palpitations  He denies having chest heaviness  He tried to cut back to every other day and noted more swelling  He denies feeling dehydration  He is compliant with his medications  His last blood work showed his lipid panel was well controlled  04/19/2022:  He is drinking 5-6 beers a day  Discussed about the association recurrent atrial fibrillation  His blood pressures here have been elevated      Patient Active Problem List   Diagnosis    SOBOE (shortness of breath on exertion)    Atrial fibrillation (HCC)    Acute on chronic diastolic CHF (congestive heart failure) [facility])     Past Medical History:   Diagnosis Date    Asthma     Atrial fibrillation ([address] )     Hyperlipidemia     Hypertension      Social History     Socioeconomic History    Marital status: Significant Other     Spouse name: Not on file    Number of children: Not on file    Years of education: Not on file    Highest education level: Not on file   Occupational History    Not on file   Tobacco Use    Smoking status: Never Smoker    Smokeless tobacco: Current User     Types: Chew    Tobacco comment: uses chewing tobacco   Vaping Use    Vaping Use: Never used   Substance and Sexual Activity    Alcohol use: Yes     Alcohol/week: 3 0 standard drinks     Types: 3 Standard drinks or equivalent per week    Drug use: No     Comment: Quit cocaine in 2012  - As per Vernon Dewey Sexual activity: Not on file   Other Topics Concern    Not on file   Social History Narrative    Not on file     Social Determinants of Health     Financial Resource Strain: Not on file   Food Insecurity: Not on file   Transportation Needs: Not on file   Physical Activity: Not on file   Stress: Not on file   Social Connections: Not on file   Intimate Partner Violence: Not on file   Housing Stability: Not on file      Family History   Problem Relation Age of Onset    No Known Problems Mother     Prostate cancer Father      No past surgical history on file  Current Outpatient Medications:     aspirin (RA Aspirin Adult Low Strength) 81 mg chewable tablet, Chew 2 tablets (162 mg total) daily Chew and swallow, Disp: 180 tablet, Rfl: 3    flecainide (TAMBOCOR) 50 mg tablet, Take 1 tablet (50 mg total) by mouth 2 (two) times a day, Disp: 180 tablet, Rfl: 3    furosemide (LASIX) 20 mg tablet, take 1 tablet by mouth once daily, Disp: 90 tablet, Rfl: 3    lansoprazole (PREVACID) 30 mg capsule, Take 30 mg by mouth daily  , Disp: , Rfl:     metoprolol succinate (TOPROL-XL) 25 mg 24 hr tablet, Take 1 tablet (25 mg total) by mouth daily in the early morning, Disp: 90 tablet, Rfl: 3    rosuvastatin (CRESTOR) 20 MG tablet, Take 1 tablet (20 mg total) by mouth daily at bedtime, Disp: 90 tablet, Rfl: 3    tadalafil (CIALIS) 20 MG tablet, Take 20 mg by mouth daily, Disp: , Rfl:     celecoxib (CeleBREX) 200 mg capsule, 1 tablet twice daily for 10 days, then decreased to once per day (Patient not taking: Reported on 10/29/2021), Disp: , Rfl:     flecainide (TAMBOCOR) 50 mg tablet, Take 1 tablet (50 mg total) by mouth 2 (two) times a day (Patient not taking: Reported on 4/19/2022 ), Disp: 180 tablet, Rfl: 3    furosemide (LASIX) 20 mg tablet, Take 1 tablet (20 mg total) by mouth daily (Patient not taking: Reported on 4/19/2022 ), Disp: 90 tablet, Rfl: 3    furosemide (LASIX) 20 mg tablet, take 1 tablet by mouth once daily (Patient not taking: Reported on 4/19/2022), Disp: 90 tablet, Rfl: 3    melatonin 1 mg, Take 3 mg by mouth daily at bedtime  (Patient not taking: Reported on 4/19/2022 ), Disp: , Rfl:   No Known Allergies    Review of Systems:  Review of Systems   Constitutional: Negative  HENT: Negative  Eyes: Negative  Cardiovascular: Negative for chest pain and leg swelling  Gastrointestinal: Negative  Endocrine: Negative  Genitourinary: Negative  Musculoskeletal: Negative  Skin: Negative  Allergic/Immunologic: Negative  Neurological: Negative  Hematological: Negative  Psychiatric/Behavioral: The patient is nervous/anxious  Vitals:    04/19/22 1506   BP: (!) 160/110   BP Location: Left arm   Patient Position: Sitting   Cuff Size: Large   Pulse: 103   Temp: 97 8 °F (36 6 °C)   SpO2: 98%   Weight: 94 3 kg (208 lb)   Height: 6' 1" (1 854 m)     Physical Exam:  Physical Exam  Vitals and nursing note reviewed  Constitutional:       General: He is not in acute distress  Appearance: He is well-developed  He is not diaphoretic  HENT:      Head: Normocephalic and atraumatic        Right Ear: External ear normal  Left Ear: External ear normal    Eyes:      General: No scleral icterus  Right eye: No discharge  Left eye: No discharge  Conjunctiva/sclera: Conjunctivae normal       Pupils: Pupils are equal, round, and reactive to light  Neck:      Thyroid: No thyromegaly  Vascular: No JVD  Trachea: No tracheal deviation  Cardiovascular:      Rate and Rhythm: Regular rhythm  Tachycardia present  Heart sounds: Murmur heard  No friction rub  Gallop present  Pulmonary:      Effort: Pulmonary effort is normal  No respiratory distress  Breath sounds: Normal breath sounds  No stridor  No wheezing or rales  Chest:      Chest wall: No tenderness  Abdominal:      General: Bowel sounds are normal  There is distension  Palpations: Abdomen is soft  There is no mass  Tenderness: There is no abdominal tenderness  There is no guarding or rebound  Musculoskeletal:         General: No tenderness or deformity  Normal range of motion  Cervical back: Normal range of motion and neck supple  Skin:     General: Skin is warm and dry  Coloration: Skin is not pale  Findings: No erythema or rash  Neurological:      Mental Status: He is alert and oriented to person, place, and time  Cranial Nerves: No cranial nerve deficit  Motor: No abnormal muscle tone  Coordination: Coordination normal       Deep Tendon Reflexes: Reflexes are normal and symmetric  Psychiatric:         Behavior: Behavior normal          Thought Content:  Thought content normal          Judgment: Judgment normal          Labs:     Lab Results   Component Value Date    WBC 6 20 09/28/2016    HGB 14 8 09/28/2016    HCT 43 0 09/28/2016    MCV 92 09/28/2016     09/28/2016     Lab Results   Component Value Date    K 4 8 10/03/2019    CL 97 10/03/2019    CO2 24 10/03/2019    BUN 15 10/03/2019    CREATININE 0 97 10/03/2019    CALCIUM 8 7 09/28/2016    AST 25 10/03/2019    ALT 38 10/03/2019    ALKPHOS 82 2016    EGFR >60 0 2016     No results found for: CHOL  Lab Results   Component Value Date    HDL 49 10/03/2019     Lab Results   Component Value Date    LDLCALC 110 (H) 10/03/2019     Lab Results   Component Value Date    TRIG 168 (H) 10/03/2019     No results found for: HGBA1C    Imaging & Testing   I have personally reviewed pertinent reports  EKG: Personally reviewed  Normal sinus rhythm inferior-t wave inversions    Cardiac testing:   Results for orders placed during the hospital encounter of 19   Echo complete with contrast if indicated    Narrative [address]  [address] [facility] [address]  [phone]    Transthoracic Echocardiogram  2D, M-mode, Doppler, and Color Doppler    Study date:  2019    Patient: Sheila Harrison  MR number: [id number]  Account number: [de-identified]  : 1970  Age: 46 years  Gender: Male  Status: Outpatient  Location: Echo lab  Height: 72 in  Weight: 209 7 lb  BP: 129/ 82 mmHg    Indications: atrial fibrillation    Diagnoses: I48 0 - Atrial fibrillation    Sonographer:  CARROLL Irvin  Primary Physician:  Aylin Arnold DO  Referring Physician:  Lisa Grider MD  Group:  [facility]  Interpreting Physician:  Desean Acosta MD    SUMMARY    LEFT VENTRICLE:  Definity given for improved LV definition  Systolic function was at the lower limits of normal  Ejection fraction was estimated in the range of 50 % to 55 % to be 55 %  There were no regional wall motion abnormalities  Wall thickness was mildly increased  LEFT ATRIUM:  The atrium was moderately dilated based on linear measurement of 4 4cm and index area of 35 ml/m2    RIGHT ATRIUM:  The atrium was mildly dilated based on right atrial area measurement of 22 cm2    MITRAL VALVE:  There was trace regurgitation      TRICUSPID VALVE:  The tricuspid jet envelope definition was inadequate for estimation of RV systolic pressure  There are no indirect findings (abnormal RV volume or geometry, altered pulmonary flow velocity profile, or leftward septal displacement) which  would suggest moderate or severe pulmonary hypertension  HISTORY: PRIOR HISTORY: HTN,Hyperlipidemia,Asthma  PROCEDURE: The procedure was performed in the echo lab  This was a routine study  The transthoracic approach was used  The study included complete 2D imaging, M-mode, complete spectral Doppler, and color Doppler  The heart rate was 105  bpm, at the start of the study  Images were obtained from the parasternal, apical, subcostal, and suprasternal notch acoustic windows  Intravenous contrast (Definity solution [1 3 ml Definity/8 7ml normal saline solution], 5 ml) was  administered to evaluate myocardial perfusion and opacify the left ventricle  Echocardiographic views were limited due to poor acoustic window availability, decreased penetration, and lung interference  This was a technically difficult  study  LEFT VENTRICLE: Definity given for improved LV definition Size was normal  Systolic function was at the lower limits of normal  Ejection fraction was estimated in the range of 50 % to 55 % to be 55 %  There were no regional wall motion  abnormalities  Wall thickness was mildly increased  No evidence of apical thrombus  DOPPLER: The study was not technically sufficient to allow evaluation of LV diastolic function  RIGHT VENTRICLE: The size was normal  Systolic function was normal  Wall thickness was normal     LEFT ATRIUM: The atrium was moderately dilated based on linear measurement of 4 4cm and index area of 35 ml/m2    RIGHT ATRIUM: The atrium was mildly dilated based on right atrial area measurement of 22 cm2    MITRAL VALVE: There was mild thickening  There was normal leaflet separation  DOPPLER: The transmitral velocity was within the normal range  There was no evidence for stenosis  There was trace regurgitation      AORTIC VALVE: The valve was probably trileaflet  Leaflets exhibited mildly increased thickness and normal cuspal separation  DOPPLER: Transaortic velocity was within the normal range  There was no evidence for stenosis  There was no  significant regurgitation  TRICUSPID VALVE: The valve structure was normal  There was normal leaflet separation  DOPPLER: The transtricuspid velocity was within the normal range  There was no evidence for stenosis  There was no significant regurgitation  The  tricuspid jet envelope definition was inadequate for estimation of RV systolic pressure  There are no indirect findings (abnormal RV volume or geometry, altered pulmonary flow velocity profile, or leftward septal displacement) which would  suggest moderate or severe pulmonary hypertension  PULMONIC VALVE: Leaflets exhibited normal thickness, no calcification, and normal cuspal separation  DOPPLER: The transpulmonic velocity was within the normal range  There was no significant regurgitation  PERICARDIUM: There was no pericardial effusion  The pericardium was normal in appearance  AORTA: The root exhibited normal size  SYSTEMIC VEINS: IVC: The inferior vena cava was normal in size  SYSTEM MEASUREMENT TABLES    2D mode  AoR Diam 2D: 3 6 cm  LA Diam (2D): 4 4 cm  LA/Ao (2D): 1 22  FS (2D Teich): 25 9 %  IVSd (2D): 1 2 cm  LVDEV: 113 cm³  LVESV: 55 5 cm³  LVIDd(2D): 4 9 cm  LVISd (2D): 3 63 cm  LVOT Area 2D: 3 14 cm squared  LVPWd (2D): 1 24 cm  SV (Teich): 57 5 cm³    Unspecified Scan Mode  DHARA Cont Eq (Peak Jason): 2 36 cm squared  LVOT Diam : 2 cm  LVOT Vmax: 775 mm/s  LVOT Vmax; Mean: 775 mm/s  Peak Grad ; Mean: 2 mm[Hg]  MV Peak E Jason   Mean: 780 mm/s  MVA (PHT): 4 cm squared  PHT: 55 ms  RA Area: 22 3 cm squared  RA Volume: 62 7 cm³  TAPSE: 1 6 cm    [employer] Accredited Echocardiography Laboratory    Prepared and electronically signed by    Frances Cortes MD  Signed 17-Jan-2019 11:16:34         Results for orders placed during the hospital encounter of 19   NM myocardial perfusion spect (stress and/or rest)    Narrative [address]  [address] [facility]  [address] [phone]    Rest/Stress Gated SPECT Myocardial Perfusion Imaging After Regadenoson    Patient: Victoria Spencer  MR number: [id number]  Account number: [de-identified]  : 1970  Age: 46 years  Gender: Male  Status: Outpatient  Location: Stress lab  Height: 72 in  Weight: 210 lb  BP: 156/ 86 mmHg    Allergies: NO KNOWN ALLERGIES    Diagnosis: I50 9 - Heart failure, unspecified    Primary Physician:  Sammi Fitzgerald DO  Technician:  Divina Almanzar  RN:  ELIER Bailey  Referring Physician:  Viridiana Olea MD  Group:  [facility] Mercado  Report Prepared By[de-identified]  ELIER Bailey  Interpreting Physician:  Walter Grove MD    INDICATIONS: Evaluation for coronary artery disease  HISTORY: The patient is a 46year old  male  Chest pain status: no chest pain  Other symptoms: dyspnea  Coronary artery disease risk factors: dyslipidemia and hypertension  Cardiovascular history: arrhythmia  Co-morbidity: history  of lung disease  Medications:an anticoagulant, a calcium channel blocker, a diuretic, clopidogrel, and an antihypertensive agent  PHYSICAL EXAM: Baseline physical exam screening: normal and no wheezes audible  REST ECG: Atrial fibrillation  PROCEDURE: The study was performed in the the Stress lab  A regadenoson infusion pharmacologic stress test was performed  Gated SPECT myocardial perfusion imaging was performed after stress and at rest  Systolic blood pressure was 156  mmHg, at the start of the study  Diastolic blood pressure was 86 mmHg, at the start of the study  The heart rate was 117 bpm, at the start of the study  IV double checked    Regadenoson protocol:  Time HR bpm SBP mmHg DBP mmHg Symptoms Rhythm/conduct  Baseline 08:55 117 156 86 none A-fib, rare PVC's  Immediate 09:05 134 174 86 mild dyspnea same as above  1 min 09:50 126 148 84 same as above --  2 min 09:51 123 150 84 same as above --  3 min 09:50 110 152 84 subsiding --  No medications or fluids given  The patient also performed low level exercise  STRESS SUMMARY: Duration of pharmacologic stress was 3 min  Maximal heart rate during stress was 150 bpm  The heart rate response to stress was normal  There was resting hypertension with an appropriate blood pressure response to stress  The rate-pressure product for the peak heart rate and blood pressure was 48126  There was no chest pain during stress  The stress test was terminated due to protocol completion  Pre oxygen saturation: 96 %  Peak oxygen saturation: 98 %  The stress ECG was equivocal for ischemia  Arrhythmia during stress: isolated premature ventricular beats  ISOTOPE ADMINISTRATION:  Resting isotope administration Stress isotope administration  Agent Tetrofosmin Tetrofosmin  Date 01/21/2019 01/21/2019    The radiopharmaceutical was injected at the peak effect of pharmacologic stress  MYOCARDIAL PERFUSION IMAGING:  Left ventricular size was normal  The TID ratio was   86  PERFUSION DEFECTS:  -  There was a moderate to large, severe, partially reversible myocardial perfusion defect of the entire inferolateral and inferoapical wall  GATED SPECT:  The calculated left ventricular ejection fraction was 43 %  Left ventricular ejection fraction was mildly decreased by visual estimate  There was moderately reduced myocardial thickening and motion of the lateral wall and inferior wall of  the left ventricle  SUMMARY:  -  Stress results: There was resting hypertension with an appropriate blood pressure response to stress  There was no chest pain during stress  -  ECG conclusions: The stress ECG was equivocal for ischemia  -  Perfusion imaging:  There was a moderate to large, severe, partially reversible myocardial perfusion defect of the entire inferolateral and inferoapical wall   -  Gated SPECT: The calculated left ventricular ejection fraction was 43 %  Left ventricular ejection fraction was mildly decreased by visual estimate  There was moderately reduced myocardial thickening and motion of the lateral wall and  inferior wall of the left ventricle  -  Impressions and recommendations: Left ventricular systolic function was reduced, with regional wall motion abnormalities  Ef 43%  Pt was in atrial Fib during study  IMPRESSIONS: Abnormal study after pharmacologic vasodilation  There was a moderate-sized infarct and a moderate amount of ischemia in the distribution of right coronary artery or the left circumflex coronary artery  Left ventricular  systolic function was reduced, with regional wall motion abnormalities  Ef 43%  Pt was in atrial Fib during study  Prepared and signed by    Kristie Pak MD  Signed 2019 12:01:54       Mid LAD: There was a diffuse 30 % stenosis  Mid circumflex: There was a discrete 25 % stenosis  RCA: There was a tubular 25 % stenosis in the middle third of the vessel segment  Sinus tach left axis fascicular block poor R-wave progression      Yenifer Jackman  Please call with any questions or suggestions    A description of the counselin min abnormal stress test, consent, catheterization  Goals and Barriers:  Patient's ability to self care:  Medication side effect reviewed with patient in detail and all their questions answered  "This note has been constructed using a voice recognition system  Therefore there may be syntax, spelling, and/or grammatical errors   Please call if you have any questions  "

## 2022-04-21 ENCOUNTER — TELEPHONE (OUTPATIENT)
Dept: CARDIOLOGY CLINIC | Facility: CLINIC | Age: 52
End: 2022-04-21

## 2022-05-13 ENCOUNTER — HOSPITAL ENCOUNTER (OUTPATIENT)
Dept: NON INVASIVE DIAGNOSTICS | Facility: CLINIC | Age: 52
Discharge: HOME | End: 2022-05-13
Payer: COMMERCIAL

## 2022-05-13 VITALS
BODY MASS INDEX: 27.57 KG/M2 | SYSTOLIC BLOOD PRESSURE: 160 MMHG | HEIGHT: 73 IN | DIASTOLIC BLOOD PRESSURE: 110 MMHG | HEART RATE: 75 BPM | WEIGHT: 208 LBS

## 2022-05-13 DIAGNOSIS — R06.02 SOBOE (SHORTNESS OF BREATH ON EXERTION): ICD-10-CM

## 2022-05-13 DIAGNOSIS — F10.10 ALCOHOL ABUSE: ICD-10-CM

## 2022-05-13 DIAGNOSIS — I48.91 ATRIAL FIBRILLATION, UNSPECIFIED TYPE (HCC): ICD-10-CM

## 2022-05-13 LAB
AORTIC ROOT: 3.7 CM
APICAL FOUR CHAMBER EJECTION FRACTION: 55 %
E WAVE DECELERATION TIME: 178 MS
FRACTIONAL SHORTENING: 42 % (ref 28–44)
INTERVENTRICULAR SEPTUM IN DIASTOLE (PARASTERNAL SHORT AXIS VIEW): 1.2 CM
INTERVENTRICULAR SEPTUM: 1.2 CM (ref 0.6–1.1)
LAAS-AP2: 22.3 CM2
LAAS-AP4: 16.8 CM2
LEFT ATRIUM AREA SYSTOLE SINGLE PLANE A4C: 17.1 CM2
LEFT ATRIUM SIZE: 4.5 CM
LEFT INTERNAL DIMENSION IN SYSTOLE: 2.9 CM (ref 2.1–4)
LEFT VENTRICULAR INTERNAL DIMENSION IN DIASTOLE: 5 CM (ref 3.5–6)
LEFT VENTRICULAR POSTERIOR WALL IN END DIASTOLE: 1.2 CM
LEFT VENTRICULAR STROKE VOLUME: 86 ML
LVSV (TEICH): 86 ML
MV E'TISSUE VEL-SEP: 8 CM/S
MV PEAK A VEL: 0.58 M/S
MV PEAK E VEL: 49 CM/S
MV STENOSIS PRESSURE HALF TIME: 52 MS
MV VALVE AREA P 1/2 METHOD: 4.23 CM2
RIGHT ATRIUM AREA SYSTOLE A4C: 17.8 CM2
RIGHT VENTRICLE ID DIMENSION: 3.3 CM
SL CV LEFT ATRIUM LENGTH A2C: 6.4 CM
SL CV LV EF: 55
SL CV PED ECHO LEFT VENTRICLE DIASTOLIC VOLUME (MOD BIPLANE) 2D: 118 ML
SL CV PED ECHO LEFT VENTRICLE SYSTOLIC VOLUME (MOD BIPLANE) 2D: 32 ML

## 2022-05-13 PROCEDURE — 93306 TTE W/DOPPLER COMPLETE: CPT

## 2022-05-13 PROCEDURE — 93306 TTE W/DOPPLER COMPLETE: CPT | Performed by: INTERNAL MEDICINE

## 2022-05-17 ENCOUNTER — CLINICAL SUPPORT (OUTPATIENT)
Dept: CARDIOLOGY CLINIC | Facility: CLINIC | Age: 52
End: 2022-05-17
Payer: COMMERCIAL

## 2022-05-17 DIAGNOSIS — R06.02 SOBOE (SHORTNESS OF BREATH ON EXERTION): ICD-10-CM

## 2022-05-17 DIAGNOSIS — I48.91 ATRIAL FIBRILLATION, UNSPECIFIED TYPE (HCC): ICD-10-CM

## 2022-05-17 PROCEDURE — 93244 EXT ECG>48HR<7D REV&INTERPJ: CPT | Performed by: INTERNAL MEDICINE

## 2022-05-18 ENCOUNTER — TELEPHONE (OUTPATIENT)
Dept: CARDIOLOGY CLINIC | Facility: CLINIC | Age: 52
End: 2022-05-18

## 2022-05-18 NOTE — TELEPHONE ENCOUNTER
----- Message from Nikita Morgan MD sent at 5/18/2022  1:19 PM EDT -----  Patient had a min HR of 63 bpm, max HR of 144 bpm, and avg HR of 94  bpm  Predominant underlying rhythm was Sinus Rhythm  Slight P wave  morphology changes were noted  Isolated SVEs were rare (<1 0%), SVE  Couplets were rare (<1 0%), and no SVE Triplets were present  Isolated  VEs were rare (<1 0%), VE Couplets were rare (<1 0%), and no VE Triplets  were present  Final Interpretation: No afib/flutter  No major arrhythmia  No change in management

## 2022-06-16 DIAGNOSIS — R60.0 BILATERAL LEG EDEMA: ICD-10-CM

## 2022-06-16 RX ORDER — FUROSEMIDE 20 MG/1
20 TABLET ORAL DAILY
Qty: 90 TABLET | Refills: 3 | Status: SHIPPED | OUTPATIENT
Start: 2022-06-16

## 2022-06-23 ENCOUNTER — TELEPHONE (OUTPATIENT)
Dept: CARDIOLOGY CLINIC | Facility: CLINIC | Age: 52
End: 2022-06-23

## 2022-06-23 NOTE — TELEPHONE ENCOUNTER
Pre  Op  Clearance Note- Cardiology    Lacey Guerrero   45 y o   male  1970      Zev Smalls MD  Q#[phone]    Anya Apple :   Patient's chart was reviewed for preop clearance and has no major cardiac contra-indication to proceed  Patient cardiac risk for surgery is intermediate    Continue current cardiac medications  Patient can hold  Aspirin for  5-days as required for surgery  He should continue his metoprolol on flecainide even the day of surgery  If you have any question please do not hesitate to call us at our office of [facility]    Phone # [phone]        Lab Results   Component Value Date    WBC 6 20 09/28/2016    HGB 14 8 09/28/2016    HCT 43 0 09/28/2016    MCV 92 09/28/2016     09/28/2016     Lab Results   Component Value Date    CREATININE 0 97 10/03/2019     No results found for: Kishore Cardenas MD  6/23/2022  9:46 AM

## 2022-06-23 NOTE — TELEPHONE ENCOUNTER
Pt called states he missed last appt with you due to being in the hospital for back surgery  He is also due to have hernia surgery 7/5 and is requesting clearance  pls sign off if pt is cleared  Thank you

## 2022-09-08 NOTE — PROCEDURES
Electrical Cardioversion  Date/Time: 3/1/2019 7:48 AM  Performed by: Lisseth Mariano MD  Authorized by: Lisseth Mariano MD     Verbal consent obtained?: No    Written consent obtained?: Yes    Consent given by:  Patient  Patient states understanding of procedure being performed: Yes    Patient identity confirmed:  Verbally with patient  Patient sedated: Yes    Sedation type: moderate (conscious) sedation    Sedation:  Propofol  Sedation start:  3/1/2019 7:49 AM  Cardioversion basis:  Elective  Pre-procedure rhythm:  Atrial fibrillation  Position: Patient was placed in a supine position    Chest area exposed: Chest area was exposed    Electrodes:  Pads  Electrodes placed:  Anterior-posterior  Number of attempts:  1  Attempt 1:     Attempt 1 mode:  Synchronous    Attempt 1 waveform:  Monophasic    Attempt 1 shock (Joules):  200    Attempt 1 outcome:  Conversion to normal sinus rhythm  Post-procedure rhythm:  Normal sinus rhythm  Complications: no complications    Patient tolerance:  Patient tolerated the procedure well with no immediate complications   1  He will continue metoprolol + add flecainide 50mg twice a day  2   Continue eliquis therapy no

## 2022-09-16 ENCOUNTER — OFFICE VISIT (OUTPATIENT)
Dept: CARDIOLOGY CLINIC | Facility: CLINIC | Age: 52
End: 2022-09-16
Payer: COMMERCIAL

## 2022-09-16 VITALS
HEIGHT: 73 IN | SYSTOLIC BLOOD PRESSURE: 164 MMHG | WEIGHT: 210 LBS | BODY MASS INDEX: 27.83 KG/M2 | DIASTOLIC BLOOD PRESSURE: 108 MMHG | HEART RATE: 79 BPM | TEMPERATURE: 98.4 F | OXYGEN SATURATION: 98 %

## 2022-09-16 DIAGNOSIS — I50.33 ACUTE ON CHRONIC DIASTOLIC CHF (CONGESTIVE HEART FAILURE) (HCC): ICD-10-CM

## 2022-09-16 DIAGNOSIS — I50.33 ACUTE ON CHRONIC HEART FAILURE WITH PRESERVED EJECTION FRACTION (HCC): ICD-10-CM

## 2022-09-16 DIAGNOSIS — R60.0 BILATERAL LEG EDEMA: ICD-10-CM

## 2022-09-16 DIAGNOSIS — E78.2 MIXED HYPERLIPIDEMIA: ICD-10-CM

## 2022-09-16 DIAGNOSIS — I25.10 CAD IN NATIVE ARTERY: ICD-10-CM

## 2022-09-16 DIAGNOSIS — I48.19 PERSISTENT ATRIAL FIBRILLATION (HCC): Primary | ICD-10-CM

## 2022-09-16 PROCEDURE — 93000 ELECTROCARDIOGRAM COMPLETE: CPT | Performed by: INTERNAL MEDICINE

## 2022-09-16 PROCEDURE — 99214 OFFICE O/P EST MOD 30 MIN: CPT | Performed by: INTERNAL MEDICINE

## 2022-09-16 RX ORDER — NEBIVOLOL 5 MG/1
5 TABLET ORAL 2 TIMES DAILY
Qty: 180 TABLET | Refills: 1 | Status: SHIPPED | OUTPATIENT
Start: 2022-09-16 | End: 2022-09-27

## 2022-09-16 RX ORDER — FUROSEMIDE 20 MG/1
TABLET ORAL
Qty: 90 TABLET | Refills: 3 | Status: SHIPPED | OUTPATIENT
Start: 2022-09-16

## 2022-09-16 RX ORDER — IBUPROFEN 800 MG/1
TABLET ORAL
COMMUNITY

## 2022-09-16 RX ORDER — DIPHENOXYLATE HYDROCHLORIDE AND ATROPINE SULFATE 2.5; .025 MG/1; MG/1
1 TABLET ORAL DAILY
COMMUNITY

## 2022-09-16 NOTE — PATIENT INSTRUCTIONS
Low-Sodium Diet   WHAT YOU NEED TO KNOW:   A low-sodium diet limits foods that are high in sodium (salt)  You will need to follow a low-sodium diet if you have high blood pressure, kidney disease, or heart failure  You may also need to follow this diet if you have a condition that is causing your body to retain (hold) extra fluid  You may need to limit the amount of sodium you eat in a day to 1,500 to 2,000 mg  Ask your healthcare provider how much sodium you can have each day  DISCHARGE INSTRUCTIONS:   How to use food labels to choose foods that are low in sodium:  Read food labels to find the amount of sodium they contain  The amount of sodium is listed in milligrams (mg)  The % Daily Value (DV) column tells you how much of your daily needs are met by 1 serving of the food for each nutrient listed  Choose foods that have less than 5% of the DV of sodium  These foods are considered low in sodium  Foods that have 20% or more of the DV of sodium are considered high in sodium  Some food labels may also list any of the following terms that tell you about the sodium content in the food:  Sodium-free:  Less than 5 mg in each serving    Very low sodium:  35 mg of sodium or less in each serving    Low sodium:  140 mg of sodium or less in each serving    Reduced sodium: At least 25% less sodium in each serving than the regular type    Light in sodium:  50% less sodium in each serving    Unsalted or no added salt:  No extra salt is added during processing (the food may still contain sodium)       Foods to avoid:  Salty foods are high in sodium   You should avoid the following:  Processed foods:      Mixes for cornbread, biscuits, cake, and pudding     Instant foods, such as potatoes, cereals, noodles, and rice     Packaged foods, such as bread stuffing, rice and pasta mixes, snack dip mixes, and macaroni and cheese     Canned foods, such as canned vegetables, soups, broths, sauces, and vegetable or tomato juice    Snack foods, such as salted chips, popcorn, pretzels, pork rinds, salted crackers, and salted nuts    Frozen foods, such as dinners, entrees, vegetables with sauces, and breaded meats    Sauerkraut, pickled vegetables, and other foods prepared in brine    Meats and cheeses:      Smoked or cured meat, such as corned beef, akhtar, ham, hot dogs, and sausage    Canned meats or spreads, such as potted meats, sardines, anchovies, and imitation seafood    Deli or lunch meats, such as bologna, ham, turkey, and roast beef    Processed cheese, such as American cheese and cheese spreads    Condiments, sauces, and seasonings:      Salt (¼ teaspoon of salt contains 575 mg of sodium)    Seasonings made with salt, such as garlic salt, celery salt, onion salt, and seasoned salt    Regular soy sauce, barbecue sauce, teriyaki sauce, steak sauce, Worcestershire sauce, and most flavored vinegars    Canned gravy and mixes     Regular condiments, such as mustard, ketchup, and salad dressings    Pickles and olives    Meat tenderizers and monosodium glutamate (MSG)    Foods to include:  Read the food label to find the amount of sodium in each serving  Bread and cereal:  Try to choose breads with less than 80 mg of sodium per serving  Bread, roll, ranulfo, tortilla, or unsalted crackers  Ready-to-eat cereals with less than 5% DV of sodium (examples include shredded wheat and puffed rice)    Pasta    Vegetables and fruits:      Unsalted fresh, frozen, or canned vegetables    Fresh, frozen, or canned fruits    Fruit juice    Dairy:  One serving has about 150 mg of sodium  Milk, all types    Yogurt    Hard cheese, such as cheddar, Swiss, [employer], or [employer] and other protein foods:  Some raw meats may have added sodium       Plain meats, fish, and poultry     Egg    Other foods:      Homemade pudding    Unsalted nuts, popcorn, or pretzels    Unsalted butter or margarine    Ways to decrease sodium:   Add spices and herbs to foods instead of salt during cooking  Use salt-free seasonings to add flavor to foods  Examples include onion powder, garlic powder, basil, bailey powder, paprika, and parsley  Try lemon or lime juice or vinegar to give foods a tart flavor  Use hot peppers, pepper, or cayenne pepper to add a spicy flavor  Do not keep a salt shaker at your kitchen table  This may help keep you from adding salt to food at the table  A teaspoon of salt has 2,300 mg of sodium  It may take time to get used to enjoying the natural flavor of food instead of adding salt  Talk to your healthcare provider before you use salt substitutes  Some salt substitutes have a high amount of potassium and need to be avoided if you have kidney disease  Choose low-sodium foods at restaurants  Meals from restaurants are often high in sodium  Some restaurants have nutrition information on the menu that tells you the amount of sodium in their foods  If possible, ask for your food to be prepared with less, or no salt  Shop for unsalted or low-sodium foods and snacks at the grocery store  Examples include unsalted or low-sodium broths, soups, and canned vegetables  Choose fresh or frozen vegetables instead  Choose unsalted nuts or seeds or fresh fruits or vegetables as snacks  Read food labels and choose salt-free, very low-sodium, or low-sodium foods  © Copyright [facility] 2022 Information is for End User's use only and may not be sold, redistributed or otherwise used for commercial purposes  All illustrations and images included in CareNotes® are the copyrighted property of [employer]  or Lior Muñoz   The above information is an  only  It is not intended as medical advice for individual conditions or treatments  Talk to your doctor, nurse or pharmacist before following any medical regimen to see if it is safe and effective for you

## 2022-09-16 NOTE — PROGRESS NOTES
Cardiology Follow-up  Leandra Sales  1970  [id number]  [address] [facility] [address] [address]  [phone]      1  Persistent atrial fibrillation (HCC)  POCT ECG   2  Bilateral leg edema  POCT ECG   3  Acute on chronic diastolic CHF (congestive heart failure) (HCC)  POCT ECG   4  Mixed hyperlipidemia  POCT ECG   5  CAD in native artery  POCT ECG      Discussion/Plan:  Atrial fibrillation-  Aspirin 162mg daily  Switch metoprolol to Bystolic 5 mg twice a day for improved blood pressure control plus flecainide 50 mg twice a day  Chronic diastolic heart failure-   we will try Jardiance 10 mg in the morning to reduce chance of volume overload in the setting of heart failure with preserved EF  He will change his Lasix to as needed  We have started Bystolic 5 mg twice a day  I discussed with him a low-sodium diet  GERD- recommend follow-up with GI    CAD- aspirin + rosuvastatin    Asthma    Alcohol abuse- cessation discussed    Hld- continue atorvastatin    History:  59-year-old gentleman with no prior cardiac history presents with increased exertional shortness of breath found to be in atrial fibrillation  He also has had worsening lower extremity edema, PND and orthopnea  A nuclear stress test was completed which shows a inferior defect with moderate dc-infarct ischemia  He had an echo 2D which shows normal LV systolic function  He has been on a diuretic with improvement in his lower extremity edema but shortness of breath still remains  He is currently in rate controlled atrial fibrillation  He denies having any prior history of myocardial infarction or stroke  He is compliant with Eliquis without any significant bleeding  He currently denies having active chest pain  03/08/2019:  He has felt much better since his cardioversion    He feels like he has much better exercise stamina  He denies having lower extremity edema  Denies having PND orthopnea  10/17/2019: His weight has been controlled  His atrial fibrillation has been well suppressed  He denies feeling chest discomfort or shortness of breath  We reviewed through his lipids  His triglycerides are still mildly elevated  He will trying cut out more of the carbohydrate in his diet  09/18/2020:  He denies having any significant palpitations  He states lower extremity swelling has been well controlled  He is compliant with medications  03/15/2021:  He reports having decreased activity since his surgery  He is compliant with medications  Denies feeling dizziness or lightheadedness  He is taking his furosemide every day  He reported having increased lower extremity swelling  10/29/2021:  He denies having any recurrence of significant palpitations  He denies having chest heaviness  He tried to cut back to every other day and noted more swelling  He denies feeling dehydration  He is compliant with his medications  His last blood work showed his lipid panel was well controlled  04/19/2022:  He is drinking 5-6 beers a day  Discussed about the association recurrent atrial fibrillation  His blood pressures here have been elevated  09/16/2022:  He has tried to cut back on alcohol  He has some room for sodium reduction  His blood pressures have been elevated  He denies having major lower extremity swelling      Patient Active Problem List   Diagnosis    SOBOE (shortness of breath on exertion)    Atrial fibrillation (HCC)    Acute on chronic diastolic CHF (congestive heart failure) ([facility] )     Past Medical History:   Diagnosis Date    Asthma     Atrial fibrillation ([facility] )     Hyperlipidemia     Hypertension      Social History     Socioeconomic History    Marital status: Significant Other     Spouse name: Not on file    Number of children: Not on file    Years of education: Not on file  Highest education level: Not on file   Occupational History    Not on file   Tobacco Use    Smoking status: Never Smoker    Smokeless tobacco: Current User     Types: Chew    Tobacco comment: uses chewing tobacco   Vaping Use    Vaping Use: Never used   Substance and Sexual Activity    Alcohol use: Yes     Alcohol/week: 3 0 standard drinks     Types: 3 Standard drinks or equivalent per week    Drug use: No     Comment: Quit cocaine in 2012  - As per Roque Conner Sexual activity: Not on file   Other Topics Concern    Not on file   Social History Narrative    Not on file     Social Determinants of Health     Financial Resource Strain: Not on file   Food Insecurity: Not on file   Transportation Needs: Not on file   Physical Activity: Not on file   Stress: Not on file   Social Connections: Not on file   Intimate Partner Violence: Not on file   Housing Stability: Not on file      Family History   Problem Relation Age of Onset    No Known Problems Mother     Prostate cancer Father      History reviewed  No pertinent surgical history  Current Outpatient Medications:     aspirin (RA Aspirin Adult Low Strength) 81 mg chewable tablet, Chew 2 tablets (162 mg total) daily Chew and swallow, Disp: 180 tablet, Rfl: 3    flecainide (TAMBOCOR) 50 mg tablet, Take 1 tablet (50 mg total) by mouth 2 (two) times a day, Disp: 180 tablet, Rfl: 3    flecainide (TAMBOCOR) 50 mg tablet, Take 1 tablet (50 mg total) by mouth 2 (two) times a day, Disp: 180 tablet, Rfl: 3    furosemide (LASIX) 20 mg tablet, Take 1 tablet (20 mg total) by mouth daily, Disp: 90 tablet, Rfl: 3    furosemide (LASIX) 20 mg tablet, take 1 tablet by mouth once daily, Disp: 90 tablet, Rfl: 3    furosemide (LASIX) 20 mg tablet, Take 1 tablet (20 mg total) by mouth daily, Disp: 90 tablet, Rfl: 3    lansoprazole (PREVACID) 30 mg capsule, Take 30 mg by mouth daily  , Disp: , Rfl:     metoprolol succinate (TOPROL-XL) 25 mg 24 hr tablet, Take 1 tablet (25 mg total) by mouth daily in the early morning, Disp: 90 tablet, Rfl: 3    multivitamin (THERAGRAN) TABS, Take 1 tablet by mouth daily, Disp: , Rfl:     rosuvastatin (CRESTOR) 20 MG tablet, Take 1 tablet (20 mg total) by mouth daily at bedtime, Disp: 90 tablet, Rfl: 3    tadalafil (CIALIS) 20 MG tablet, Take 20 mg by mouth daily, Disp: , Rfl:     celecoxib (CeleBREX) 200 mg capsule, 1 tablet twice daily for 10 days, then decreased to once per day (Patient not taking: No sig reported), Disp: , Rfl:     ibuprofen (MOTRIN) 800 mg tablet, ibuprofen 800 mg tablet, Disp: , Rfl:     melatonin 1 mg, Take 3 mg by mouth daily at bedtime  (Patient not taking: No sig reported), Disp: , Rfl:   No Known Allergies    Review of Systems:  Review of Systems   Constitutional: Negative  HENT: Negative  Eyes: Negative  Cardiovascular: Negative for chest pain and leg swelling  Gastrointestinal: Negative  Endocrine: Negative  Genitourinary: Negative  Musculoskeletal: Negative  Skin: Negative  Allergic/Immunologic: Negative  Neurological: Negative  Hematological: Negative  Psychiatric/Behavioral: The patient is nervous/anxious  Vitals:    09/16/22 0758   BP: (!) 164/108   BP Location: Right arm   Patient Position: Sitting   Cuff Size: Standard   Pulse: 79   Temp: 98 4 °F (36 9 °C)   SpO2: 98%   Weight: 95 3 kg (210 lb)   Height: 6' 1" (1 854 m)     Physical Exam:  Physical Exam  Vitals and nursing note reviewed  Constitutional:       General: He is not in acute distress  Appearance: He is well-developed  He is not diaphoretic  HENT:      Head: Normocephalic and atraumatic  Right Ear: External ear normal       Left Ear: External ear normal    Eyes:      General: No scleral icterus  Right eye: No discharge  Left eye: No discharge  Conjunctiva/sclera: Conjunctivae normal       Pupils: Pupils are equal, round, and reactive to light     Neck:      Thyroid: No thyromegaly  Vascular: No JVD  Trachea: No tracheal deviation  Cardiovascular:      Rate and Rhythm: Regular rhythm  Tachycardia present  Heart sounds: Murmur heard  No friction rub  Gallop present  Pulmonary:      Effort: Pulmonary effort is normal  No respiratory distress  Breath sounds: Normal breath sounds  No stridor  No wheezing or rales  Chest:      Chest wall: No tenderness  Abdominal:      General: Bowel sounds are normal  There is distension  Palpations: Abdomen is soft  There is no mass  Tenderness: There is no abdominal tenderness  There is no guarding or rebound  Musculoskeletal:         General: No tenderness or deformity  Normal range of motion  Cervical back: Normal range of motion and neck supple  Skin:     General: Skin is warm and dry  Coloration: Skin is not pale  Findings: No erythema or rash  Neurological:      Mental Status: He is alert and oriented to person, place, and time  Cranial Nerves: No cranial nerve deficit  Motor: No abnormal muscle tone  Coordination: Coordination normal       Deep Tendon Reflexes: Reflexes are normal and symmetric  Psychiatric:         Behavior: Behavior normal          Thought Content:  Thought content normal          Judgment: Judgment normal          Labs:     Lab Results   Component Value Date    WBC 6 20 09/28/2016    HGB 14 8 09/28/2016    HCT 43 0 09/28/2016    MCV 92 09/28/2016     09/28/2016     Lab Results   Component Value Date    K 4 8 10/03/2019    CL 97 10/03/2019    CO2 24 10/03/2019    BUN 15 10/03/2019    CREATININE 0 97 10/03/2019    CALCIUM 8 7 09/28/2016    AST 25 10/03/2019    ALT 38 10/03/2019    ALKPHOS 82 09/28/2016    EGFR >60 0 09/28/2016     No results found for: CHOL  Lab Results   Component Value Date    HDL 49 10/03/2019     Lab Results   Component Value Date    LDLCALC 110 (H) 10/03/2019     Lab Results   Component Value Date    TRIG 168 (H) 10/03/2019     No results found for: HGBA1C    Imaging & Testing   I have personally reviewed pertinent reports  EKG: Personally reviewed  Normal sinus rhythm inferior-t wave inversions    Cardiac testing:   Results for orders placed during the hospital encounter of 19   Echo complete with contrast if indicated    Narrative [address]  [address] [facility]  Ramone Almeida  [phone]    Transthoracic Echocardiogram  2D, M-mode, Doppler, and Color Doppler    Study date:  2019    Patient: Elen Rios  MR number: [id number]  Account number: [de-identified]  : 1970  Age: 46 years  Gender: Male  Status: Outpatient  Location: Echo lab  Height: 72 in  Weight: 209 7 lb  BP: 129/ 82 mmHg    Indications: atrial fibrillation    Diagnoses: I48 0 - Atrial fibrillation    Sonographer:  CARROLL Duque  Primary Physician:  Grady Clark DO  Referring Physician:  Merlinda Keys, MD  Group:  [facility]  Interpreting Physician:  Hien Francisco MD    SUMMARY    LEFT VENTRICLE:  Definity given for improved LV definition  Systolic function was at the lower limits of normal  Ejection fraction was estimated in the range of 50 % to 55 % to be 55 %  There were no regional wall motion abnormalities  Wall thickness was mildly increased  LEFT ATRIUM:  The atrium was moderately dilated based on linear measurement of 4 4cm and index area of 35 ml/m2    RIGHT ATRIUM:  The atrium was mildly dilated based on right atrial area measurement of 22 cm2    MITRAL VALVE:  There was trace regurgitation  TRICUSPID VALVE:  The tricuspid jet envelope definition was inadequate for estimation of RV systolic pressure  There are no indirect findings (abnormal RV volume or geometry, altered pulmonary flow velocity profile, or leftward septal displacement) which  would suggest moderate or severe pulmonary hypertension      HISTORY: PRIOR HISTORY: HTN,Hyperlipidemia,Asthma  PROCEDURE: The procedure was performed in the echo lab  This was a routine study  The transthoracic approach was used  The study included complete 2D imaging, M-mode, complete spectral Doppler, and color Doppler  The heart rate was 105  bpm, at the start of the study  Images were obtained from the parasternal, apical, subcostal, and suprasternal notch acoustic windows  Intravenous contrast (Definity solution [1 3 ml Definity/8 7ml normal saline solution], 5 ml) was  administered to evaluate myocardial perfusion and opacify the left ventricle  Echocardiographic views were limited due to poor acoustic window availability, decreased penetration, and lung interference  This was a technically difficult  study  LEFT VENTRICLE: Definity given for improved LV definition Size was normal  Systolic function was at the lower limits of normal  Ejection fraction was estimated in the range of 50 % to 55 % to be 55 %  There were no regional wall motion  abnormalities  Wall thickness was mildly increased  No evidence of apical thrombus  DOPPLER: The study was not technically sufficient to allow evaluation of LV diastolic function  RIGHT VENTRICLE: The size was normal  Systolic function was normal  Wall thickness was normal     LEFT ATRIUM: The atrium was moderately dilated based on linear measurement of 4 4cm and index area of 35 ml/m2    RIGHT ATRIUM: The atrium was mildly dilated based on right atrial area measurement of 22 cm2    MITRAL VALVE: There was mild thickening  There was normal leaflet separation  DOPPLER: The transmitral velocity was within the normal range  There was no evidence for stenosis  There was trace regurgitation  AORTIC VALVE: The valve was probably trileaflet  Leaflets exhibited mildly increased thickness and normal cuspal separation  DOPPLER: Transaortic velocity was within the normal range  There was no evidence for stenosis   There was no  significant regurgitation  TRICUSPID VALVE: The valve structure was normal  There was normal leaflet separation  DOPPLER: The transtricuspid velocity was within the normal range  There was no evidence for stenosis  There was no significant regurgitation  The  tricuspid jet envelope definition was inadequate for estimation of RV systolic pressure  There are no indirect findings (abnormal RV volume or geometry, altered pulmonary flow velocity profile, or leftward septal displacement) which would  suggest moderate or severe pulmonary hypertension  PULMONIC VALVE: Leaflets exhibited normal thickness, no calcification, and normal cuspal separation  DOPPLER: The transpulmonic velocity was within the normal range  There was no significant regurgitation  PERICARDIUM: There was no pericardial effusion  The pericardium was normal in appearance  AORTA: The root exhibited normal size  SYSTEMIC VEINS: IVC: The inferior vena cava was normal in size  SYSTEM MEASUREMENT TABLES    2D mode  AoR Diam 2D: 3 6 cm  LA Diam (2D): 4 4 cm  LA/Ao (2D): 1 22  FS (2D Teich): 25 9 %  IVSd (2D): 1 2 cm  LVDEV: 113 cm³  LVESV: 55 5 cm³  LVIDd(2D): 4 9 cm  LVISd (2D): 3 63 cm  LVOT Area 2D: 3 14 cm squared  LVPWd (2D): 1 24 cm  SV (Teich): 57 5 cm³    Unspecified Scan Mode  DHARA Cont Eq (Peak Jason): 2 36 cm squared  LVOT Diam : 2 cm  LVOT Vmax: 775 mm/s  LVOT Vmax; Mean: 775 mm/s  Peak Grad ; Mean: 2 mm[Hg]  MV Peak E Jason   Mean: 780 mm/s  MVA (PHT): 4 cm squared  PHT: 55 ms  RA Area: 22 3 cm squared  RA Volume: 62 7 cm³  TAPSE: 1 6 cm    [employer] Accredited Echocardiography Laboratory    Prepared and electronically signed by    Wallace Delong MD  Signed 17-Jan-2019 11:16:34         Results for orders placed during the hospital encounter of 01/21/19   NM myocardial perfusion spect (stress and/or rest)    [facility] [address]  [phone]    Rest/Stress Gated SPECT Myocardial Perfusion Imaging After Regadenoson    Patient: Mellissa Sanchez  MR number: [id number]  Account number: [de-identified]  : 1970  Age: 46 years  Gender: Male  Status: Outpatient  Location: Stress lab  Height: 72 in  Weight: 210 lb  BP: 156/ 86 mmHg    Allergies: NO KNOWN ALLERGIES    Diagnosis: I50 9 - Heart failure, unspecified    Primary Physician:  Nicholos Boas, DO  Technician:  Jesus Oliver  RN:  ELIER Terrazas  Referring Physician:  Jorge Luis Lucas MD  Group:  [facility]  Report Prepared By[de-identified]  ELIER Terrazas  Interpreting Physician:  Norman Herring MD    INDICATIONS: Evaluation for coronary artery disease  HISTORY: The patient is a 46year old  male  Chest pain status: no chest pain  Other symptoms: dyspnea  Coronary artery disease risk factors: dyslipidemia and hypertension  Cardiovascular history: arrhythmia  Co-morbidity: history  of lung disease  Medications:an anticoagulant, a calcium channel blocker, a diuretic, clopidogrel, and an antihypertensive agent  PHYSICAL EXAM: Baseline physical exam screening: normal and no wheezes audible  REST ECG: Atrial fibrillation  PROCEDURE: The study was performed in the the Stress lab  A regadenoson infusion pharmacologic stress test was performed  Gated SPECT myocardial perfusion imaging was performed after stress and at rest  Systolic blood pressure was 156  mmHg, at the start of the study  Diastolic blood pressure was 86 mmHg, at the start of the study  The heart rate was 117 bpm, at the start of the study  IV double checked  Regadenoson protocol:  Time HR bpm SBP mmHg DBP mmHg Symptoms Rhythm/conduct  Baseline 08:55 117 156 86 none A-fib, rare PVC's  Immediate 09:05 134 174 86 mild dyspnea same as above  1 min 09:50 126 148 84 same as above --  2 min 09:51 123 150 84 same as above --  3 min 09:50 110 152 84 subsiding --  No medications or fluids given  The patient also performed low level exercise      STRESS SUMMARY: Duration of pharmacologic stress was 3 min  Maximal heart rate during stress was 150 bpm  The heart rate response to stress was normal  There was resting hypertension with an appropriate blood pressure response to stress  The rate-pressure product for the peak heart rate and blood pressure was 39607  There was no chest pain during stress  The stress test was terminated due to protocol completion  Pre oxygen saturation: 96 %  Peak oxygen saturation: 98 %  The stress ECG was equivocal for ischemia  Arrhythmia during stress: isolated premature ventricular beats  ISOTOPE ADMINISTRATION:  Resting isotope administration Stress isotope administration  Agent Tetrofosmin Tetrofosmin  Date 01/21/2019 01/21/2019    The radiopharmaceutical was injected at the peak effect of pharmacologic stress  MYOCARDIAL PERFUSION IMAGING:  Left ventricular size was normal  The TID ratio was   86  PERFUSION DEFECTS:  -  There was a moderate to large, severe, partially reversible myocardial perfusion defect of the entire inferolateral and inferoapical wall  GATED SPECT:  The calculated left ventricular ejection fraction was 43 %  Left ventricular ejection fraction was mildly decreased by visual estimate  There was moderately reduced myocardial thickening and motion of the lateral wall and inferior wall of  the left ventricle  SUMMARY:  -  Stress results: There was resting hypertension with an appropriate blood pressure response to stress  There was no chest pain during stress  -  ECG conclusions: The stress ECG was equivocal for ischemia  -  Perfusion imaging: There was a moderate to large, severe, partially reversible myocardial perfusion defect of the entire inferolateral and inferoapical wall  -  Gated SPECT: The calculated left ventricular ejection fraction was 43 %  Left ventricular ejection fraction was mildly decreased by visual estimate   There was moderately reduced myocardial thickening and motion of the lateral wall and  inferior wall of the left ventricle  -  Impressions and recommendations: Left ventricular systolic function was reduced, with regional wall motion abnormalities  Ef 43%  Pt was in atrial Fib during study  IMPRESSIONS: Abnormal study after pharmacologic vasodilation  There was a moderate-sized infarct and a moderate amount of ischemia in the distribution of right coronary artery or the left circumflex coronary artery  Left ventricular  systolic function was reduced, with regional wall motion abnormalities  Ef 43%  Pt was in atrial Fib during study  Prepared and signed by    Eitan Crenshaw MD  Signed 2019 12:01:54       Mid LAD: There was a diffuse 30 % stenosis  Mid circumflex: There was a discrete 25 % stenosis  RCA: There was a tubular 25 % stenosis in the middle third of the vessel segment  Sinus tach left axis fascicular block poor R-wave progression      Yakelin Jackman  Please call with any questions or suggestions    A description of the counselin min abnormal stress test, consent, catheterization  Goals and Barriers:  Patient's ability to self care:  Medication side effect reviewed with patient in detail and all their questions answered  "This note has been constructed using a voice recognition system  Therefore there may be syntax, spelling, and/or grammatical errors   Please call if you have any questions  "

## 2022-09-26 ENCOUNTER — TELEPHONE (OUTPATIENT)
Dept: CARDIOLOGY CLINIC | Facility: CLINIC | Age: 52
End: 2022-09-26

## 2022-09-26 DIAGNOSIS — I48.91 ATRIAL FIBRILLATION, UNSPECIFIED TYPE (HCC): Primary | ICD-10-CM

## 2022-09-26 NOTE — TELEPHONE ENCOUNTER
Pt called states since medications were switched his bp dropped to 80/60 so he went back to the metoprolol  States he will need a refill   pls advise

## 2022-09-27 RX ORDER — METOPROLOL SUCCINATE 25 MG/1
25 TABLET, EXTENDED RELEASE ORAL DAILY
Qty: 90 TABLET | Refills: 1 | Status: SHIPPED | OUTPATIENT
Start: 2022-09-27

## 2022-09-29 NOTE — TELEPHONE ENCOUNTER
Left a v/m for the patient to return a call to the clinical staff  Per Dr Chencho Wilkinson, stop the Metoprolol  Start on Bystolic 5 mg once daily    Ask pt if he submitted the [address] to pharmacist?

## 2022-11-11 ENCOUNTER — OFFICE VISIT (OUTPATIENT)
Dept: CARDIOLOGY CLINIC | Facility: CLINIC | Age: 52
End: 2022-11-11

## 2022-11-11 VITALS
SYSTOLIC BLOOD PRESSURE: 132 MMHG | BODY MASS INDEX: 23.83 KG/M2 | DIASTOLIC BLOOD PRESSURE: 84 MMHG | HEIGHT: 78 IN | TEMPERATURE: 98.4 F | OXYGEN SATURATION: 98 % | WEIGHT: 206 LBS

## 2022-11-11 DIAGNOSIS — I50.33 ACUTE ON CHRONIC HEART FAILURE WITH PRESERVED EJECTION FRACTION (HCC): ICD-10-CM

## 2022-11-11 DIAGNOSIS — I48.19 PERSISTENT ATRIAL FIBRILLATION (HCC): Primary | ICD-10-CM

## 2022-11-11 DIAGNOSIS — I50.33 ACUTE ON CHRONIC DIASTOLIC CHF (CONGESTIVE HEART FAILURE) (HCC): ICD-10-CM

## 2022-11-11 DIAGNOSIS — R60.0 BILATERAL LEG EDEMA: ICD-10-CM

## 2022-11-11 DIAGNOSIS — I48.91 ATRIAL FIBRILLATION, UNSPECIFIED TYPE (HCC): ICD-10-CM

## 2022-11-11 DIAGNOSIS — I25.10 CAD IN NATIVE ARTERY: ICD-10-CM

## 2022-11-11 DIAGNOSIS — E78.2 MIXED HYPERLIPIDEMIA: ICD-10-CM

## 2022-11-11 RX ORDER — FUROSEMIDE 20 MG/1
20 TABLET ORAL DAILY
Qty: 90 TABLET | Refills: 1 | Status: SHIPPED | OUTPATIENT
Start: 2022-11-11

## 2022-11-11 RX ORDER — METOPROLOL SUCCINATE 25 MG/1
25 TABLET, EXTENDED RELEASE ORAL DAILY
Qty: 90 TABLET | Refills: 1 | Status: SHIPPED | OUTPATIENT
Start: 2022-11-11

## 2022-11-11 NOTE — PROGRESS NOTES
Cardiology Follow-up  Marty Sherman  1970  [id number]  [address] [facility] [address] [id number]  [phone]      1  Persistent atrial fibrillation (HCC)  POCT ECG   2  Bilateral leg edema  POCT ECG    furosemide (LASIX) 20 mg tablet   3  Mixed hyperlipidemia  POCT ECG   4  CAD in native artery  POCT ECG   5  Acute on chronic diastolic CHF (congestive heart failure) (HCC)  POCT ECG    furosemide (LASIX) 20 mg tablet   6  Acute on chronic heart failure with preserved ejection fraction (HCC)  furosemide (LASIX) 20 mg tablet   7  Atrial fibrillation, unspecified type ([facility])  metoprolol succinate (TOPROL-XL) 25 mg 24 hr tablet      Discussion/Plan:  Atrial fibrillation-  Aspirin 162mg daily  Unable to tolerate Bystolic  He is back on metoprolol 25 mg in the morning and flecainide  Chronic diastolic heart failure-   he is watching his sodium usage  He is back on Lasix  He did not like the way he felt on Jardiance  He will continue on his metoprolol    GERD- recommend follow-up with GI    CAD- aspirin + rosuvastatin    Asthma    Alcohol abuse- cessation discussed  He has elevated LFTs  I discussed with him about cutting back on alcohol usage  Recommend periodic monitoring of his LFTs    Hld- continue atorvastatin    History:  78-year-old gentleman with no prior cardiac history presents with increased exertional shortness of breath found to be in atrial fibrillation  He also has had worsening lower extremity edema, PND and orthopnea  A nuclear stress test was completed which shows a inferior defect with moderate dc-infarct ischemia  He had an echo 2D which shows normal LV systolic function  He has been on a diuretic with improvement in his lower extremity edema but shortness of breath still remains  He is currently in rate controlled atrial fibrillation    He denies having any prior history of myocardial infarction or stroke  He is compliant with Eliquis without any significant bleeding  He currently denies having active chest pain  03/08/2019:  He has felt much better since his cardioversion  He feels like he has much better exercise stamina  He denies having lower extremity edema  Denies having PND orthopnea  10/17/2019: His weight has been controlled  His atrial fibrillation has been well suppressed  He denies feeling chest discomfort or shortness of breath  We reviewed through his lipids  His triglycerides are still mildly elevated  He will trying cut out more of the carbohydrate in his diet  09/18/2020:  He denies having any significant palpitations  He states lower extremity swelling has been well controlled  He is compliant with medications  03/15/2021:  He reports having decreased activity since his surgery  He is compliant with medications  Denies feeling dizziness or lightheadedness  He is taking his furosemide every day  He reported having increased lower extremity swelling  10/29/2021:  He denies having any recurrence of significant palpitations  He denies having chest heaviness  He tried to cut back to every other day and noted more swelling  He denies feeling dehydration  He is compliant with his medications  His last blood work showed his lipid panel was well controlled  04/19/2022:  He is drinking 5-6 beers a day  Discussed about the association recurrent atrial fibrillation  His blood pressures here have been elevated  09/16/2022:  He has tried to cut back on alcohol  He has some room for sodium reduction  His blood pressures have been elevated  He denies having major lower extremity swelling  11/11/2022:  He has cut back on sodium products  He states his blood pressures are better  He was eating ramen soups  He is currently back on Lasix  He he did not like the way he felt on Jardiance      Patient Active Problem List   Diagnosis   • SOBOE (shortness of breath on exertion)   • Atrial fibrillation (HCC)   • Acute on chronic diastolic CHF (congestive heart failure) (HCC)     Past Medical History:   Diagnosis Date   • Asthma    • Atrial fibrillation ([facility] [address] )    • Hyperlipidemia    • Hypertension      Social History     Socioeconomic History   • Marital status: Significant Other     Spouse name: Not on file   • Number of children: Not on file   • Years of education: Not on file   • Highest education level: Not on file   Occupational History   • Not on file   Tobacco Use   • Smoking status: Never Smoker   • Smokeless tobacco: Current User     Types: Chew   • Tobacco comment: uses chewing tobacco   Vaping Use   • Vaping Use: Never used   Substance and Sexual Activity   • Alcohol use: Yes     Alcohol/week: 3 0 standard drinks     Types: 3 Standard drinks or equivalent per week   • Drug use: No     Comment: Quit cocaine in 2012  - As per [place]    • Sexual activity: Not on file   Other Topics Concern   • Not on file   Social History Narrative   • Not on file     Social Determinants of Health     Financial Resource Strain: Not on file   Food Insecurity: Not on file   Transportation Needs: Not on file   Physical Activity: Not on file   Stress: Not on file   Social Connections: Not on file   Intimate Partner Violence: Not on file   Housing Stability: Not on file      Family History   Problem Relation Age of Onset   • No Known Problems Mother    • Prostate cancer Father      History reviewed  No pertinent surgical history      Current Outpatient Medications:   •  aspirin (RA Aspirin Adult Low Strength) 81 mg chewable tablet, Chew 2 tablets (162 mg total) daily Chew and swallow, Disp: 180 tablet, Rfl: 3  •  flecainide (TAMBOCOR) 50 mg tablet, Take 1 tablet (50 mg total) by mouth 2 (two) times a day, Disp: 180 tablet, Rfl: 3  •  flecainide (TAMBOCOR) 50 mg tablet, Take 1 tablet (50 mg total) by mouth 2 (two) times a day, Disp: 180 tablet, Rfl: 3  •  furosemide (LASIX) 20 mg tablet, Take 1 tablet (20 mg total) by mouth daily, Disp: 90 tablet, Rfl: 1  •  ibuprofen (MOTRIN) 800 mg tablet, ibuprofen 800 mg tablet, Disp: , Rfl:   •  lansoprazole (PREVACID) 30 mg capsule, Take 30 mg by mouth daily  , Disp: , Rfl:   •  metoprolol succinate (TOPROL-XL) 25 mg 24 hr tablet, Take 1 tablet (25 mg total) by mouth daily, Disp: 90 tablet, Rfl: 1  •  multivitamin (THERAGRAN) TABS, Take 1 tablet by mouth daily, Disp: , Rfl:   •  rosuvastatin (CRESTOR) 20 MG tablet, Take 1 tablet (20 mg total) by mouth daily at bedtime, Disp: 90 tablet, Rfl: 3  •  tadalafil (CIALIS) 20 MG tablet, Take 20 mg by mouth daily, Disp: , Rfl:   •  celecoxib (CeleBREX) 200 mg capsule, 1 tablet twice daily for 10 days, then decreased to once per day (Patient not taking: No sig reported), Disp: , Rfl:   •  melatonin 1 mg, Take 3 mg by mouth daily at bedtime  (Patient not taking: No sig reported), Disp: , Rfl:   No Known Allergies    Review of Systems:  Review of Systems   Constitutional: Negative  HENT: Negative  Eyes: Negative  Cardiovascular: Negative for chest pain and leg swelling  Gastrointestinal: Negative  Endocrine: Negative  Genitourinary: Negative  Musculoskeletal: Negative  Skin: Negative  Allergic/Immunologic: Negative  Neurological: Negative  Hematological: Negative  Psychiatric/Behavioral: The patient is nervous/anxious  Vitals:    11/11/22 0809   BP: 132/84   BP Location: Right arm   Patient Position: Sitting   Cuff Size: Standard   Temp: 98 4 °F (36 9 °C)   SpO2: 98%   Weight: 93 4 kg (206 lb)   Height: 6' 11" (2 108 m)     Physical Exam:  Physical Exam  Vitals and nursing note reviewed  Constitutional:       General: He is not in acute distress  Appearance: He is well-developed  He is not diaphoretic  HENT:      Head: Normocephalic and atraumatic        Right Ear: External ear normal       Left Ear: External ear normal    Eyes: General: No scleral icterus  Right eye: No discharge  Left eye: No discharge  Conjunctiva/sclera: Conjunctivae normal       Pupils: Pupils are equal, round, and reactive to light  Neck:      Thyroid: No thyromegaly  Vascular: No JVD  Trachea: No tracheal deviation  Cardiovascular:      Rate and Rhythm: Regular rhythm  Tachycardia present  Heart sounds: Murmur heard  No friction rub  Gallop present  Pulmonary:      Effort: Pulmonary effort is normal  No respiratory distress  Breath sounds: Normal breath sounds  No stridor  No wheezing or rales  Chest:      Chest wall: No tenderness  Abdominal:      General: Bowel sounds are normal  There is distension  Palpations: Abdomen is soft  There is no mass  Tenderness: There is no abdominal tenderness  There is no guarding or rebound  Musculoskeletal:         General: No tenderness or deformity  Normal range of motion  Cervical back: Normal range of motion and neck supple  Skin:     General: Skin is warm and dry  Coloration: Skin is not pale  Findings: No erythema or rash  Neurological:      Mental Status: He is alert and oriented to person, place, and time  Cranial Nerves: No cranial nerve deficit  Motor: No abnormal muscle tone  Coordination: Coordination normal       Deep Tendon Reflexes: Reflexes are normal and symmetric  Psychiatric:         Behavior: Behavior normal          Thought Content:  Thought content normal          Judgment: Judgment normal          Labs:     Lab Results   Component Value Date    WBC 6 20 09/28/2016    HGB 14 8 09/28/2016    HCT 43 0 09/28/2016    MCV 92 09/28/2016     09/28/2016     Lab Results   Component Value Date    K 4 8 10/03/2019    CL 97 10/03/2019    CO2 24 10/03/2019    BUN 15 10/03/2019    CREATININE 0 97 10/03/2019    CALCIUM 8 7 09/28/2016    AST 25 10/03/2019    ALT 38 10/03/2019    ALKPHOS 82 09/28/2016    EGFR >60 0 2016     No results found for: CHOL  Lab Results   Component Value Date    HDL 49 10/03/2019     Lab Results   Component Value Date    LDLCALC 110 (H) 10/03/2019     Lab Results   Component Value Date    TRIG 168 (H) 10/03/2019     No results found for: HGBA1C    Imaging & Testing   I have personally reviewed pertinent reports  EKG: Personally reviewed  Normal sinus rhythm inferior-t wave inversions    Cardiac testing:   Results for orders placed during the hospital encounter of 19   Echo complete with contrast if indicated    Narrative [address]  [address] [facility], [place] [address]  [phone]    Transthoracic Echocardiogram  2D, M-mode, Doppler, and Color Doppler    Study date:  2019    Patient: Penny Regan  MR number: [id number]  Account number: [de-identified]  : 1970  Age: 46 years  Gender: Male  Status: Outpatient  Location: Echo lab  Height: 72 in  Weight: 209 7 lb  BP: 129/ 82 mmHg    Indications: atrial fibrillation    Diagnoses: I48 0 - Atrial fibrillation    Sonographer:  CARROLL Christie  Primary Physician:  Candice Reynolds DO  Referring Physician:  Chinyere Bradley MD  Group:  [facility]  Interpreting Physician:  Suly Raymond MD    SUMMARY    LEFT VENTRICLE:  Definity given for improved LV definition  Systolic function was at the lower limits of normal  Ejection fraction was estimated in the range of 50 % to 55 % to be 55 %  There were no regional wall motion abnormalities  Wall thickness was mildly increased  LEFT ATRIUM:  The atrium was moderately dilated based on linear measurement of 4 4cm and index area of 35 ml/m2    RIGHT ATRIUM:  The atrium was mildly dilated based on right atrial area measurement of 22 cm2    MITRAL VALVE:  There was trace regurgitation  TRICUSPID VALVE:  The tricuspid jet envelope definition was inadequate for estimation of RV systolic pressure   There are no indirect findings (abnormal RV volume or geometry, altered pulmonary flow velocity profile, or leftward septal displacement) which  would suggest moderate or severe pulmonary hypertension  HISTORY: PRIOR HISTORY: HTN,Hyperlipidemia,Asthma  PROCEDURE: The procedure was performed in the echo lab  This was a routine study  The transthoracic approach was used  The study included complete 2D imaging, M-mode, complete spectral Doppler, and color Doppler  The heart rate was 105  bpm, at the start of the study  Images were obtained from the parasternal, apical, subcostal, and suprasternal notch acoustic windows  Intravenous contrast (Definity solution [1 3 ml Definity/8 7ml normal saline solution], 5 ml) was  administered to evaluate myocardial perfusion and opacify the left ventricle  Echocardiographic views were limited due to poor acoustic window availability, decreased penetration, and lung interference  This was a technically difficult  study  LEFT VENTRICLE: Definity given for improved LV definition Size was normal  Systolic function was at the lower limits of normal  Ejection fraction was estimated in the range of 50 % to 55 % to be 55 %  There were no regional wall motion  abnormalities  Wall thickness was mildly increased  No evidence of apical thrombus  DOPPLER: The study was not technically sufficient to allow evaluation of LV diastolic function  RIGHT VENTRICLE: The size was normal  Systolic function was normal  Wall thickness was normal     LEFT ATRIUM: The atrium was moderately dilated based on linear measurement of 4 4cm and index area of 35 ml/m2    RIGHT ATRIUM: The atrium was mildly dilated based on right atrial area measurement of 22 cm2    MITRAL VALVE: There was mild thickening  There was normal leaflet separation  DOPPLER: The transmitral velocity was within the normal range  There was no evidence for stenosis  There was trace regurgitation  AORTIC VALVE: The valve was probably trileaflet   Leaflets exhibited mildly increased thickness and normal cuspal separation  DOPPLER: Transaortic velocity was within the normal range  There was no evidence for stenosis  There was no  significant regurgitation  TRICUSPID VALVE: The valve structure was normal  There was normal leaflet separation  DOPPLER: The transtricuspid velocity was within the normal range  There was no evidence for stenosis  There was no significant regurgitation  The  tricuspid jet envelope definition was inadequate for estimation of RV systolic pressure  There are no indirect findings (abnormal RV volume or geometry, altered pulmonary flow velocity profile, or leftward septal displacement) which would  suggest moderate or severe pulmonary hypertension  PULMONIC VALVE: Leaflets exhibited normal thickness, no calcification, and normal cuspal separation  DOPPLER: The transpulmonic velocity was within the normal range  There was no significant regurgitation  PERICARDIUM: There was no pericardial effusion  The pericardium was normal in appearance  AORTA: The root exhibited normal size  SYSTEMIC VEINS: IVC: The inferior vena cava was normal in size  SYSTEM MEASUREMENT TABLES    2D mode  AoR Diam 2D: 3 6 cm  LA Diam (2D): 4 4 cm  LA/Ao (2D): 1 22  FS (2D Teich): 25 9 %  IVSd (2D): 1 2 cm  LVDEV: 113 cm³  LVESV: 55 5 cm³  LVIDd(2D): 4 9 cm  LVISd (2D): 3 63 cm  LVOT Area 2D: 3 14 cm squared  LVPWd (2D): 1 24 cm  SV (Teich): 57 5 cm³    Unspecified Scan Mode  DHARA Cont Eq (Peak Jason): 2 36 cm squared  LVOT Diam : 2 cm  LVOT Vmax: 775 mm/s  LVOT Vmax; Mean: 775 mm/s  Peak Grad ; Mean: 2 mm[Hg]  MV Peak E Jason   Mean: 780 mm/s  MVA (PHT): 4 cm squared  PHT: 55 ms  RA Area: 22 3 cm squared  RA Volume: 62 7 cm³  TAPSE: 1 6 cm    [employer] Accredited Echocardiography Laboratory    Prepared and electronically signed by    Rehan Bennett MD  Signed 17-Jan-2019 11:16:34         Results for orders placed during the hospital encounter of 19   NM myocardial perfusion spect (stress and/or rest)    Narrative [address]  [address] [facility]  [place] [address] [phone]    Rest/Stress Gated SPECT Myocardial Perfusion Imaging After Regadenoson    Patient: Chantel Nguyen  MR number: [id number]  Account number: [de-identified]  : 1970  Age: 46 years  Gender: Male  Status: Outpatient  Location: Stress lab  Height: 72 in  Weight: 210 lb  BP: 156/ 86 mmHg    Allergies: NO KNOWN ALLERGIES    Diagnosis: I50 9 - Heart failure, unspecified    Primary Physician:  Divina Estrada DO  Technician:  Iris Merino  RN:  [place] Officer, BSN  Referring Physician:  Anil Kaiser MD  Group:  [facility]  Report Prepared By[de-identified]  [place] Officer, BSN  Interpreting Physician:  Jesus Douglass MD    INDICATIONS: Evaluation for coronary artery disease  HISTORY: The patient is a 46year old  male  Chest pain status: no chest pain  Other symptoms: dyspnea  Coronary artery disease risk factors: dyslipidemia and hypertension  Cardiovascular history: arrhythmia  Co-morbidity: history  of lung disease  Medications:an anticoagulant, a calcium channel blocker, a diuretic, clopidogrel, and an antihypertensive agent  PHYSICAL EXAM: Baseline physical exam screening: normal and no wheezes audible  REST ECG: Atrial fibrillation  PROCEDURE: The study was performed in the the Stress lab  A regadenoson infusion pharmacologic stress test was performed  Gated SPECT myocardial perfusion imaging was performed after stress and at rest  Systolic blood pressure was 156  mmHg, at the start of the study  Diastolic blood pressure was 86 mmHg, at the start of the study  The heart rate was 117 bpm, at the start of the study  IV double checked    Regadenoson protocol:  Time HR bpm SBP mmHg DBP mmHg Symptoms Rhythm/conduct  Baseline 08:55 117 156 86 none A-fib, rare PVC's  Immediate 09:05 134 174 86 mild dyspnea same as above  1 min 09:50 126 148 84 same as above --  2 min 09:51 123 150 84 same as above --  3 min 09:50 110 152 84 subsiding --  No medications or fluids given  The patient also performed low level exercise  STRESS SUMMARY: Duration of pharmacologic stress was 3 min  Maximal heart rate during stress was 150 bpm  The heart rate response to stress was normal  There was resting hypertension with an appropriate blood pressure response to stress  The rate-pressure product for the peak heart rate and blood pressure was 05519  There was no chest pain during stress  The stress test was terminated due to protocol completion  Pre oxygen saturation: 96 %  Peak oxygen saturation: 98 %  The stress ECG was equivocal for ischemia  Arrhythmia during stress: isolated premature ventricular beats  ISOTOPE ADMINISTRATION:  Resting isotope administration Stress isotope administration  Agent Tetrofosmin Tetrofosmin  Date 01/21/2019 01/21/2019    The radiopharmaceutical was injected at the peak effect of pharmacologic stress  MYOCARDIAL PERFUSION IMAGING:  Left ventricular size was normal  The TID ratio was   86  PERFUSION DEFECTS:  -  There was a moderate to large, severe, partially reversible myocardial perfusion defect of the entire inferolateral and inferoapical wall  GATED SPECT:  The calculated left ventricular ejection fraction was 43 %  Left ventricular ejection fraction was mildly decreased by visual estimate  There was moderately reduced myocardial thickening and motion of the lateral wall and inferior wall of  the left ventricle  SUMMARY:  -  Stress results: There was resting hypertension with an appropriate blood pressure response to stress  There was no chest pain during stress  -  ECG conclusions: The stress ECG was equivocal for ischemia  -  Perfusion imaging: There was a moderate to large, severe, partially reversible myocardial perfusion defect of the entire inferolateral and inferoapical wall    -  Gated SPECT: The calculated left ventricular ejection fraction was 43 %  Left ventricular ejection fraction was mildly decreased by visual estimate  There was moderately reduced myocardial thickening and motion of the lateral wall and  inferior wall of the left ventricle  -  Impressions and recommendations: Left ventricular systolic function was reduced, with regional wall motion abnormalities  Ef 43%  Pt was in atrial Fib during study  IMPRESSIONS: Abnormal study after pharmacologic vasodilation  There was a moderate-sized infarct and a moderate amount of ischemia in the distribution of right coronary artery or the left circumflex coronary artery  Left ventricular  systolic function was reduced, with regional wall motion abnormalities  Ef 43%  Pt was in atrial Fib during study  Prepared and signed by    Vicente Sellers MD  Signed 2019 12:01:54       Mid LAD: There was a diffuse 30 % stenosis  Mid circumflex: There was a discrete 25 % stenosis  RCA: There was a tubular 25 % stenosis in the middle third of the vessel segment  Sinus tach left axis fascicular block poor R-wave progression      Neil Jackman  Please call with any questions or suggestions    A description of the counselin min abnormal stress test, consent, catheterization  Goals and Barriers:  Patient's ability to self care:  Medication side effect reviewed with patient in detail and all their questions answered  "This note has been constructed using a voice recognition system  Therefore there may be syntax, spelling, and/or grammatical errors   Please call if you have any questions  "

## 2022-12-14 DIAGNOSIS — I48.19 PERSISTENT ATRIAL FIBRILLATION (HCC): ICD-10-CM

## 2022-12-14 DIAGNOSIS — I25.10 CAD IN NATIVE ARTERY: ICD-10-CM

## 2022-12-14 RX ORDER — ROSUVASTATIN CALCIUM 20 MG/1
20 TABLET, COATED ORAL
Qty: 90 TABLET | Refills: 3 | Status: SHIPPED | OUTPATIENT
Start: 2022-12-14

## 2022-12-14 RX ORDER — FLECAINIDE ACETATE 50 MG/1
50 TABLET ORAL 2 TIMES DAILY
Qty: 180 TABLET | Refills: 3 | Status: SHIPPED | OUTPATIENT
Start: 2022-12-14

## 2022-12-23 ENCOUNTER — TELEPHONE (OUTPATIENT)
Dept: OTHER | Facility: OTHER | Age: 52
End: 2022-12-23

## 2022-12-23 NOTE — TELEPHONE ENCOUNTER
Call from patient requesting call back to schedule an appointment with Dr Rogerio Singer  Please return his call

## 2022-12-27 ENCOUNTER — TELEPHONE (OUTPATIENT)
Dept: CARDIOLOGY CLINIC | Facility: CLINIC | Age: 52
End: 2022-12-27

## 2022-12-27 NOTE — TELEPHONE ENCOUNTER
Pt called to report he was seen at [facility]   + SOB, enlarged heart; fluid around heart, coughing up foamy phlegm  Pt would like to transfer his care to [address] [place] [address] Drive of PREETI Robles [phone] (closer to home ) appt sched  on 1/18th  Pt asked, if I could call them to get seen sooner  Call made  No sooner appt 's  he will be placed on wait/cancellation list  Or pt can try their other location in [facility] to call [phone] Text to Derrick Keller for any recommendations in the interim

## 2024-01-21 DIAGNOSIS — I25.10 CAD IN NATIVE ARTERY: ICD-10-CM

## 2024-01-22 RX ORDER — ROSUVASTATIN CALCIUM 20 MG/1
20 TABLET, COATED ORAL
Qty: 90 TABLET | Refills: 0 | Status: SHIPPED | OUTPATIENT
Start: 2024-01-22

## 2024-12-20 ENCOUNTER — APPOINTMENT (OUTPATIENT)
Dept: URGENT CARE | Facility: CLINIC | Age: 54
End: 2024-12-20
Payer: OTHER MISCELLANEOUS

## 2024-12-20 PROCEDURE — 99213 OFFICE O/P EST LOW 20 MIN: CPT | Performed by: NURSE PRACTITIONER

## 2024-12-30 ENCOUNTER — APPOINTMENT (OUTPATIENT)
Dept: URGENT CARE | Facility: CLINIC | Age: 54
End: 2024-12-30
Payer: OTHER MISCELLANEOUS

## 2024-12-30 PROCEDURE — 99214 OFFICE O/P EST MOD 30 MIN: CPT | Performed by: FAMILY MEDICINE
